# Patient Record
Sex: MALE | Race: OTHER | NOT HISPANIC OR LATINO | Employment: FULL TIME | URBAN - METROPOLITAN AREA
[De-identification: names, ages, dates, MRNs, and addresses within clinical notes are randomized per-mention and may not be internally consistent; named-entity substitution may affect disease eponyms.]

---

## 2018-12-19 ENCOUNTER — APPOINTMENT (OUTPATIENT)
Dept: LAB | Facility: HOSPITAL | Age: 58
End: 2018-12-19
Attending: FAMILY MEDICINE
Payer: COMMERCIAL

## 2018-12-19 ENCOUNTER — TRANSCRIBE ORDERS (OUTPATIENT)
Dept: ADMINISTRATIVE | Facility: HOSPITAL | Age: 58
End: 2018-12-19

## 2018-12-19 DIAGNOSIS — Z13.21 SCREENING FOR MALNUTRITION: Primary | ICD-10-CM

## 2018-12-19 DIAGNOSIS — Z13.21 SCREENING FOR MALNUTRITION: ICD-10-CM

## 2018-12-19 LAB
25(OH)D3 SERPL-MCNC: 18.5 NG/ML (ref 30–100)
ALBUMIN SERPL BCP-MCNC: 4.5 G/DL (ref 3.5–5.7)
ALP SERPL-CCNC: 54 U/L (ref 55–165)
ALT SERPL W P-5'-P-CCNC: 28 U/L (ref 7–52)
ANION GAP SERPL CALCULATED.3IONS-SCNC: 9 MMOL/L (ref 4–13)
AST SERPL W P-5'-P-CCNC: 26 U/L (ref 13–39)
BILIRUB SERPL-MCNC: 0.6 MG/DL (ref 0.2–1)
BUN SERPL-MCNC: 14 MG/DL (ref 7–25)
CALCIUM SERPL-MCNC: 8.9 MG/DL (ref 8.6–10.5)
CHLORIDE SERPL-SCNC: 104 MMOL/L (ref 98–107)
CHOLEST SERPL-MCNC: 174 MG/DL (ref 0–200)
CO2 SERPL-SCNC: 27 MMOL/L (ref 21–31)
CREAT SERPL-MCNC: 0.93 MG/DL (ref 0.7–1.3)
ERYTHROCYTE [DISTWIDTH] IN BLOOD BY AUTOMATED COUNT: 13.7 % (ref 11.6–15.1)
GFR SERPL CREATININE-BSD FRML MDRD: 89 ML/MIN/1.73SQ M
GLUCOSE P FAST SERPL-MCNC: 97 MG/DL (ref 65–99)
HCT VFR BLD AUTO: 43.2 % (ref 42–52)
HDLC SERPL-MCNC: 61 MG/DL (ref 40–60)
HGB BLD-MCNC: 14.6 G/DL (ref 12–17)
LDLC SERPL CALC-MCNC: 98 MG/DL (ref 0–100)
MCH RBC QN AUTO: 29.5 PG (ref 26.8–34.3)
MCHC RBC AUTO-ENTMCNC: 33.7 G/DL (ref 31.4–37.4)
MCV RBC AUTO: 87 FL (ref 82–98)
NONHDLC SERPL-MCNC: 113 MG/DL
PLATELET # BLD AUTO: 165 THOUSANDS/UL (ref 149–390)
PMV BLD AUTO: 8.5 FL (ref 8.9–12.7)
POTASSIUM SERPL-SCNC: 4.4 MMOL/L (ref 3.5–5.5)
PROT SERPL-MCNC: 7 G/DL (ref 6.4–8.9)
RBC # BLD AUTO: 4.95 MILLION/UL (ref 3.88–5.62)
SODIUM SERPL-SCNC: 140 MMOL/L (ref 134–143)
TRIGL SERPL-MCNC: 74 MG/DL (ref 44–166)
TSH SERPL DL<=0.05 MIU/L-ACNC: 2.16 UIU/ML (ref 0.45–5.33)
VIT B12 SERPL-MCNC: 219 PG/ML (ref 100–900)
WBC # BLD AUTO: 4.1 THOUSAND/UL (ref 4.31–10.16)

## 2018-12-19 PROCEDURE — 36415 COLL VENOUS BLD VENIPUNCTURE: CPT

## 2018-12-19 PROCEDURE — 82306 VITAMIN D 25 HYDROXY: CPT

## 2018-12-19 PROCEDURE — 80061 LIPID PANEL: CPT

## 2018-12-19 PROCEDURE — 82607 VITAMIN B-12: CPT

## 2018-12-19 PROCEDURE — 84443 ASSAY THYROID STIM HORMONE: CPT

## 2018-12-19 PROCEDURE — 80053 COMPREHEN METABOLIC PANEL: CPT

## 2018-12-19 PROCEDURE — 85027 COMPLETE CBC AUTOMATED: CPT

## 2020-01-29 ENCOUNTER — APPOINTMENT (OUTPATIENT)
Dept: RADIOLOGY | Facility: CLINIC | Age: 60
End: 2020-01-29

## 2020-01-29 ENCOUNTER — TRANSCRIBE ORDERS (OUTPATIENT)
Dept: LAB | Facility: CLINIC | Age: 60
End: 2020-01-29

## 2020-01-29 ENCOUNTER — APPOINTMENT (OUTPATIENT)
Dept: LAB | Facility: CLINIC | Age: 60
End: 2020-01-29

## 2020-01-29 DIAGNOSIS — R10.9 ABDOMINAL PAIN, UNSPECIFIED ABDOMINAL LOCATION: ICD-10-CM

## 2020-01-29 DIAGNOSIS — Z13.21 ENCOUNTER FOR SCREENING FOR NUTRITIONAL DISORDER: ICD-10-CM

## 2020-01-29 DIAGNOSIS — Z13.21 ENCOUNTER FOR SCREENING FOR NUTRITIONAL DISORDER: Primary | ICD-10-CM

## 2020-01-29 LAB
ALBUMIN SERPL BCP-MCNC: 4.1 G/DL (ref 3.5–5)
ALP SERPL-CCNC: 55 U/L (ref 46–116)
ALT SERPL W P-5'-P-CCNC: 25 U/L (ref 12–78)
ANION GAP SERPL CALCULATED.3IONS-SCNC: 1 MMOL/L (ref 4–13)
AST SERPL W P-5'-P-CCNC: 16 U/L (ref 5–45)
BILIRUB SERPL-MCNC: 0.46 MG/DL (ref 0.2–1)
BUN SERPL-MCNC: 17 MG/DL (ref 5–25)
CALCIUM SERPL-MCNC: 9 MG/DL (ref 8.3–10.1)
CHLORIDE SERPL-SCNC: 109 MMOL/L (ref 100–108)
CO2 SERPL-SCNC: 29 MMOL/L (ref 21–32)
CREAT SERPL-MCNC: 0.89 MG/DL (ref 0.6–1.3)
ERYTHROCYTE [DISTWIDTH] IN BLOOD BY AUTOMATED COUNT: 13.3 % (ref 11.6–15.1)
GFR SERPL CREATININE-BSD FRML MDRD: 92 ML/MIN/1.73SQ M
GLUCOSE P FAST SERPL-MCNC: 94 MG/DL (ref 65–99)
HCT VFR BLD AUTO: 42 % (ref 36.5–49.3)
HGB BLD-MCNC: 14 G/DL (ref 12–17)
MCH RBC QN AUTO: 29.4 PG (ref 26.8–34.3)
MCHC RBC AUTO-ENTMCNC: 33.3 G/DL (ref 31.4–37.4)
MCV RBC AUTO: 88 FL (ref 82–98)
PLATELET # BLD AUTO: 182 THOUSANDS/UL (ref 149–390)
PMV BLD AUTO: 10.2 FL (ref 8.9–12.7)
POTASSIUM SERPL-SCNC: 4.5 MMOL/L (ref 3.5–5.3)
PROT SERPL-MCNC: 7.3 G/DL (ref 6.4–8.2)
RBC # BLD AUTO: 4.76 MILLION/UL (ref 3.88–5.62)
SODIUM SERPL-SCNC: 139 MMOL/L (ref 136–145)
WBC # BLD AUTO: 6.22 THOUSAND/UL (ref 4.31–10.16)

## 2020-01-29 PROCEDURE — 80053 COMPREHEN METABOLIC PANEL: CPT

## 2020-01-29 PROCEDURE — 85027 COMPLETE CBC AUTOMATED: CPT

## 2020-01-29 PROCEDURE — 36415 COLL VENOUS BLD VENIPUNCTURE: CPT

## 2020-01-29 PROCEDURE — 74018 RADEX ABDOMEN 1 VIEW: CPT

## 2020-07-27 ENCOUNTER — OFFICE VISIT (OUTPATIENT)
Dept: URGENT CARE | Facility: CLINIC | Age: 60
End: 2020-07-27
Payer: COMMERCIAL

## 2020-07-27 VITALS
HEART RATE: 78 BPM | TEMPERATURE: 97.4 F | RESPIRATION RATE: 18 BRPM | OXYGEN SATURATION: 98 % | DIASTOLIC BLOOD PRESSURE: 72 MMHG | SYSTOLIC BLOOD PRESSURE: 123 MMHG
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
## 2020-07-27 DIAGNOSIS — Z71.1 WORRIED WELL: ICD-10-CM

## 2020-07-27 PROCEDURE — 99283 EMERGENCY DEPT VISIT LOW MDM: CPT | Performed by: NURSE PRACTITIONER

## 2020-07-27 PROCEDURE — 99203 OFFICE O/P NEW LOW 30 MIN: CPT | Performed by: NURSE PRACTITIONER

## 2020-07-27 PROCEDURE — G0382 LEV 3 HOSP TYPE B ED VISIT: HCPCS | Performed by: NURSE PRACTITIONER

## 2020-07-27 NOTE — PROGRESS NOTES
[facility] [facility]        NAME: Lavern Shaffer is a 56 y o  male  : 1960    MRN: [id number]  DATE: 2020  TIME: 2:52 PM    Assessment and Plan   Multiple insect bites [W57  XXXA]  1  Multiple insect bites     2  Worried well           Patient Instructions     There are no Patient Instructions on file for this visit  Chief Complaint     Chief Complaint   Patient presents with   [address]     yesterday was bit on forehead, chin and both arms  History of Present Illness   José Antonio Payne presents to the clinic c/o    This is a 59-year-old male here today with complaints of yellow candice stings  He had several stings on his forehead and on his arms  He states he was doing yd work and pulled up some grass and yellow jacks came out  He denies any shortness of breath or chest pain  He states Tdap is up-to-date  No fevers bodies or chills  Review of Systems   Review of Systems   Constitutional: Negative  HENT: Negative  Respiratory: Negative  Cardiovascular: Negative  Skin: Positive for wound  Neurological: Negative  Current Medications     Long-Term Medications   Medication Sig Dispense Refill    cetirizine (ZyrTEC) 10 mg tablet Take 10 mg by mouth daily         Current Allergies     Allergies as of 2020    (No Known Allergies)            The following portions of the patient's history were reviewed and updated as appropriate: allergies, current medications, past family history, past medical history, past social history, past surgical history and problem list     Objective   /72   Pulse 78   Temp (!) 97 4 °F (36 3 °C) (Temporal)   Resp 18   SpO2 98%        Physical Exam     Physical Exam   Constitutional: He appears well-developed and well-nourished  Cardiovascular: Normal rate, regular rhythm and normal heart sounds  Pulmonary/Chest: Effort normal and breath sounds normal    Skin: Skin is warm     Several small bites on left arm, no erythema no warmth  Bites on forehead are not noticeable   Psychiatric: He has a normal mood and affect  His behavior is normal  Judgment and thought content normal    Nursing note and vitals reviewed

## 2020-08-06 ENCOUNTER — OFFICE VISIT (OUTPATIENT)
Dept: URGENT CARE | Facility: CLINIC | Age: 60
End: 2020-08-06
Payer: COMMERCIAL

## 2020-08-06 VITALS
HEART RATE: 62 BPM | SYSTOLIC BLOOD PRESSURE: 117 MMHG | TEMPERATURE: 97.2 F | DIASTOLIC BLOOD PRESSURE: 75 MMHG | BODY MASS INDEX: 21.82 KG/M2 | HEIGHT: 64 IN | RESPIRATION RATE: 18 BRPM | WEIGHT: 127.8 LBS | OXYGEN SATURATION: 100 %

## 2020-08-06 DIAGNOSIS — H92.01 EARACHE ON RIGHT: ICD-10-CM
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
## 2020-08-06 DIAGNOSIS — S00.86XA INSECT BITE OF OTHER PART OF HEAD, INITIAL ENCOUNTER: Primary | ICD-10-CM

## 2020-08-06 PROCEDURE — G0382 LEV 3 HOSP TYPE B ED VISIT: HCPCS | Performed by: NURSE PRACTITIONER

## 2020-08-06 PROCEDURE — 99213 OFFICE O/P EST LOW 20 MIN: CPT | Performed by: NURSE PRACTITIONER

## 2020-08-06 PROCEDURE — 99283 EMERGENCY DEPT VISIT LOW MDM: CPT | Performed by: NURSE PRACTITIONER

## 2020-08-06 RX ORDER — CETIRIZINE HYDROCHLORIDE 10 MG/1
10 TABLET ORAL DAILY
COMMUNITY
End: 2021-04-16

## 2020-08-06 NOTE — PROGRESS NOTES
[facility]        NAME: David Parnell is a 56 y o  male  : 1960    MRN: [id number]  DATE: 2020  TIME: 5:16 PM    Assessment and Plan   Insect bite of other part of head, initial encounter [S00 96XA, W57  XXXA]  1  Insect bite of other part of head, initial encounter     2  Earache on right           Patient Instructions     Patient Instructions   Please return or proceed ER if he develops any facial swelling, fever, increased ear pain, decreased hearing, or any new or concerning symptoms  Advised follow-up with PCP in 3-5 days    Insect Bite or Sting   WHAT YOU NEED TO KNOW:   Most insect bites and stings are not dangerous and go away without treatment  Your symptoms may be mild, or you may develop anaphylaxis  Anaphylaxis is a sudden, life-threatening reaction that needs immediate treatment  Common examples of insects that bite or sting are bees, ticks, mosquitoes, spiders, and ants  Insect bites or stings can lead to diseases such as malaria, West Nile virus, Lyme disease, or [place] Spotted Fever  DISCHARGE INSTRUCTIONS:   Call 911 for signs or symptoms of anaphylaxis,  such as trouble breathing, swelling in your mouth or throat, or wheezing  You may also have itching, a rash, hives, or feel like you are going to faint  Return to the emergency department if:   · You are stung on your tongue or in your throat  · A white area forms around the bite  · You are sweating badly or have body pain  · You think you were bitten or stung by a poisonous insect  Contact your healthcare provider if:   · You have a fever  · The area becomes red, warm, tender, and swollen beyond the area of the bite or sting  · You have questions or concerns about your condition or care  Medicines:   · Antihistamines  decrease itching and rash  · Epinephrine  is used to treat severe allergic reactions such as anaphylaxis  · Take your medicine as directed    Contact your healthcare provider if you think your medicine is not helping or if you have side effects  Tell him of her if you are allergic to any medicine  Keep a list of the medicines, vitamins, and herbs you take  Include the amounts, and when and why you take them  Bring the list or the pill bottles to follow-up visits  Carry your medicine list with you in case of an emergency  Steps to take for signs or symptoms of anaphylaxis:   · Immediately  give 1 shot of epinephrine only into the outer thigh muscle  · Leave the shot in place  as directed  Your healthcare provider may recommend you leave it in place for up to 10 seconds before you remove it  This helps make sure all of the epinephrine is delivered  · Call 911 and go to the emergency department,  even if the shot improved symptoms  Do not drive yourself  Bring the used epinephrine shot with you  Safety precautions to take if you are at risk for anaphylaxis:   · Keep 2 shots of epinephrine with you at all times  You may need a second shot, because epinephrine only works for about 20 minutes and symptoms may return  Your healthcare provider can show you and family members how to give the shot  Check the expiration date every month and replace it before it expires  · Create an action plan  Your healthcare provider can help you create a written plan that explains the allergy and an emergency plan to treat a reaction  The plan explains when to give a second epinephrine shot if symptoms return or do not improve after the first  Give copies of the action plan and emergency instructions to family members, work and school staff, and  providers  Show them how to give a shot of epinephrine  · Carry medical alert identification  Wear medical alert jewelry or carry a card that says you have an insect allergy  Ask your healthcare provider where to get these items  If an insect bites or stings you:   · Remove the stinger    Scrape the stinger out with your fingernail, edge of a credit card, or a knife blade  Do not squeeze the wound  Gently wash the area with soap and water  · Remove the tick  Ticks must be removed as soon as possible so you do not get diseases passed through tick bites  Ask your healthcare provider for more information on tick bites and how to remove ticks  Care for a bite or sting wound:   · Elevate the affected area  Prop the wound above the level of your heart, if possible  Elevate the area for 10 to 20 minutes each hour or as directed by your healthcare provider  · Use compresses  Soak a clean washcloth in cold water, wring it out, and put it on the bite or sting  Use the compress for 10 to 20 minutes each hour or as directed by your healthcare provider  After 24 to 48 hours, change to warm compresses  · Apply a paste  Add water to baking soda to make a thick paste  Put the paste on the area for 5 minutes  Rinse gently to remove the paste  Prevent another insect bite or sting:   · Do not wear bright-colored or flower-print clothing when you plan to spend time outdoors  Do not use hairspray, perfumes, or aftershave  · Do not leave food out  · Empty any standing water and wash container with soap and water every 2 days  · Put screens on all open windows and doors  · Put insect repellent that contains DEET on skin that is showing when you go outside  Put insect repellent at the top of your boots, bottom of pant legs, and sleeve cuffs  Wear long sleeves, pants, and shoes  · Use citronella candles outdoors to help keep mosquitoes away  Put a tick and flea collar on pets  Follow up with your healthcare provider as directed:  Write down your questions so you remember to ask them during your visits  © 2017 [employer] Information is for End User's use only and may not be sold, redistributed or otherwise used for commercial purposes   All illustrations and images included in CareNotes® are the copyrighted property of [employer]  or [employer]  The above information is an  only  It is not intended as medical advice for individual conditions or treatments  Talk to your doctor, nurse or pharmacist before following any medical regimen to see if it is safe and effective for you  Follow up with PCP in 3-5 days  Proceed to  ER if symptoms worsen  Chief Complaint     Chief Complaint   Patient presents with    Insect Bite     right cheek was see here  aweek ago     Earache     right ear pain started monday after bite          History of Present Illness       Patient is a 58-year-old male presents with a 4 day history of right ear pain  Patient also notes insect bite to his right cheek  Patient states he was evaluated 1 week ago  Denies any facial swelling, tongue or lip swelling, difficulty breathing  Denies any ear drainage, tinnitus, decreased hearing  Has a recent ear infection  Denies any fever, chills, or body aches  Denies any headache, dizziness or feeling lightheaded  Denies any cough shortness of breath  Denies any recent sick contacts or known exposure to COVID-19  Patient denies any pain or pruritus to right cheek  Review of Systems   Review of Systems   Constitutional: Negative for chills, diaphoresis, fatigue and fever  HENT: Positive for ear pain  Negative for congestion, ear discharge, facial swelling, hearing loss, postnasal drip, rhinorrhea, sinus pressure, sinus pain, sore throat and trouble swallowing  Eyes: Negative  Respiratory: Negative for cough, chest tightness, shortness of breath, wheezing and stridor  Cardiovascular: Negative for chest pain and palpitations  Gastrointestinal: Negative  Musculoskeletal: Negative for arthralgias, back pain, joint swelling, myalgias, neck pain and neck stiffness  Skin: Negative for rash     Neurological: Negative for dizziness, syncope, facial asymmetry, speech difficulty, weakness, light-headedness, numbness and headaches  Current Medications       Current Outpatient Medications:     cetirizine (ZyrTEC) 10 mg tablet, Take 10 mg by mouth daily, Disp: , Rfl:     Current Allergies     Allergies as of 08/06/2020    (No Known Allergies)            The following portions of the patient's history were reviewed and updated as appropriate: allergies, current medications, past family history, past medical history, past social history, past surgical history and problem list      History reviewed  No pertinent past medical history  History reviewed  No pertinent surgical history  No family history on file  Medications have been verified  Objective   /75   Pulse 62   Temp (!) 97 2 °F (36 2 °C)   Resp 18   Ht 5' 4" (1 626 m)   Wt 58 kg (127 lb 12 8 oz)   SpO2 100%   BMI 21 94 kg/m²        Physical Exam     Physical Exam   Constitutional: He is oriented to person, place, and time  No distress  HENT:   Head: Normocephalic and atraumatic  Right Ear: Hearing, tympanic membrane, external ear and ear canal normal  No swelling or tenderness  Tympanic membrane is not erythematous and not bulging  Left Ear: Hearing, tympanic membrane, external ear and ear canal normal    Nose: Nose normal    Mouth/Throat: Uvula is midline  Mucous membranes are moist  Oropharynx is clear  Cardiovascular: Normal rate, regular rhythm, S1 normal, S2 normal and normal heart sounds  Pulmonary/Chest: Effort normal and breath sounds normal  There is normal air entry  Abdominal: Normal appearance  Lymphadenopathy:        Head (right side): No preauricular and no posterior auricular adenopathy present  Head (left side): No preauricular and no posterior auricular adenopathy present  He has no cervical adenopathy  Neurological: He is alert and oriented to person, place, and time  GCS eye subscore is 4  GCS verbal subscore is 5  GCS motor subscore is 6     Skin: Skin is warm and dry  Capillary refill takes less than 2 seconds  He is not diaphoretic

## 2020-08-06 NOTE — PATIENT INSTRUCTIONS
Please return or proceed ER if he develops any facial swelling, fever, increased ear pain, decreased hearing, or any new or concerning symptoms  Advised follow-up with PCP in 3-5 days    Insect Bite or Sting   WHAT YOU NEED TO KNOW:   Most insect bites and stings are not dangerous and go away without treatment  Your symptoms may be mild, or you may develop anaphylaxis  Anaphylaxis is a sudden, life-threatening reaction that needs immediate treatment  Common examples of insects that bite or sting are bees, ticks, mosquitoes, spiders, and ants  Insect bites or stings can lead to diseases such as malaria, West Nile virus, Lyme disease, or [place] Spotted Fever  DISCHARGE INSTRUCTIONS:   Call 911 for signs or symptoms of anaphylaxis,  such as trouble breathing, swelling in your mouth or throat, or wheezing  You may also have itching, a rash, hives, or feel like you are going to faint  Return to the emergency department if:   · You are stung on your tongue or in your throat  · A white area forms around the bite  · You are sweating badly or have body pain  · You think you were bitten or stung by a poisonous insect  Contact your healthcare provider if:   · You have a fever  · The area becomes red, warm, tender, and swollen beyond the area of the bite or sting  · You have questions or concerns about your condition or care  Medicines:   · Antihistamines  decrease itching and rash  · Epinephrine  is used to treat severe allergic reactions such as anaphylaxis  · Take your medicine as directed  Contact your healthcare provider if you think your medicine is not helping or if you have side effects  Tell him of her if you are allergic to any medicine  Keep a list of the medicines, vitamins, and herbs you take  Include the amounts, and when and why you take them  Bring the list or the pill bottles to follow-up visits  Carry your medicine list with you in case of an emergency    Steps to take for signs or symptoms of anaphylaxis:   · Immediately  give 1 shot of epinephrine only into the outer thigh muscle  · Leave the shot in place  as directed  Your healthcare provider may recommend you leave it in place for up to 10 seconds before you remove it  This helps make sure all of the epinephrine is delivered  · Call 911 and go to the emergency department,  even if the shot improved symptoms  Do not drive yourself  Bring the used epinephrine shot with you  Safety precautions to take if you are at risk for anaphylaxis:   · Keep 2 shots of epinephrine with you at all times  You may need a second shot, because epinephrine only works for about 20 minutes and symptoms may return  Your healthcare provider can show you and family members how to give the shot  Check the expiration date every month and replace it before it expires  · Create an action plan  Your healthcare provider can help you create a written plan that explains the allergy and an emergency plan to treat a reaction  The plan explains when to give a second epinephrine shot if symptoms return or do not improve after the first  Give copies of the action plan and emergency instructions to family members, work and school staff, and  providers  Show them how to give a shot of epinephrine  · Carry medical alert identification  Wear medical alert jewelry or carry a card that says you have an insect allergy  Ask your healthcare provider where to get these items  If an insect bites or stings you:   · Remove the stinger  Scrape the stinger out with your fingernail, edge of a credit card, or a knife blade  Do not squeeze the wound  Gently wash the area with soap and water  · Remove the tick  Ticks must be removed as soon as possible so you do not get diseases passed through tick bites  Ask your healthcare provider for more information on tick bites and how to remove ticks  Care for a bite or sting wound:   · Elevate the affected area  Prop the wound above the level of your heart, if possible  Elevate the area for 10 to 20 minutes each hour or as directed by your healthcare provider  · Use compresses  Soak a clean washcloth in cold water, wring it out, and put it on the bite or sting  Use the compress for 10 to 20 minutes each hour or as directed by your healthcare provider  After 24 to 48 hours, change to warm compresses  · Apply a paste  Add water to baking soda to make a thick paste  Put the paste on the area for 5 minutes  Rinse gently to remove the paste  Prevent another insect bite or sting:   · Do not wear bright-colored or flower-print clothing when you plan to spend time outdoors  Do not use hairspray, perfumes, or aftershave  · Do not leave food out  · Empty any standing water and wash container with soap and water every 2 days  · Put screens on all open windows and doors  · Put insect repellent that contains DEET on skin that is showing when you go outside  Put insect repellent at the top of your boots, bottom of pant legs, and sleeve cuffs  Wear long sleeves, pants, and shoes  · Use citronella candles outdoors to help keep mosquitoes away  Put a tick and flea collar on pets  Follow up with your healthcare provider as directed:  Write down your questions so you remember to ask them during your visits  © 2017 [employer] Information is for End User's use only and may not be sold, redistributed or otherwise used for commercial purposes  All illustrations and images included in CareNotes® are the copyrighted property of [employer]  or [employer]  The above information is an  only  It is not intended as medical advice for individual conditions or treatments  Talk to your doctor, nurse or pharmacist before following any medical regimen to see if it is safe and effective for you

## 2021-01-09 ENCOUNTER — OFFICE VISIT (OUTPATIENT)
Dept: URGENT CARE | Facility: CLINIC | Age: 61
End: 2021-01-09
Payer: COMMERCIAL

## 2021-01-09 VITALS
OXYGEN SATURATION: 99 % | SYSTOLIC BLOOD PRESSURE: 118 MMHG | HEIGHT: 64 IN | DIASTOLIC BLOOD PRESSURE: 62 MMHG | BODY MASS INDEX: 22.2 KG/M2 | TEMPERATURE: 96.9 F | HEART RATE: 74 BPM | WEIGHT: 130 LBS

## 2021-01-09 DIAGNOSIS — R52 BODY ACHES: Primary | ICD-10-CM

## 2021-01-09 DIAGNOSIS — Z20.822 EXPOSURE TO COVID-19 VIRUS: ICD-10-CM

## 2021-01-09 PROCEDURE — U0003 INFECTIOUS AGENT DETECTION BY NUCLEIC ACID (DNA OR RNA); SEVERE ACUTE RESPIRATORY SYNDROME CORONAVIRUS 2 (SARS-COV-2) (CORONAVIRUS DISEASE [COVID-19]), AMPLIFIED PROBE TECHNIQUE, MAKING USE OF HIGH THROUGHPUT TECHNOLOGIES AS DESCRIBED BY CMS-2020-01-R: HCPCS | Performed by: NURSE PRACTITIONER

## 2021-01-09 PROCEDURE — G0382 LEV 3 HOSP TYPE B ED VISIT: HCPCS | Performed by: NURSE PRACTITIONER

## 2021-01-09 PROCEDURE — 99213 OFFICE O/P EST LOW 20 MIN: CPT | Performed by: NURSE PRACTITIONER

## 2021-01-09 PROCEDURE — 99283 EMERGENCY DEPT VISIT LOW MDM: CPT | Performed by: NURSE PRACTITIONER

## 2021-01-09 PROCEDURE — U0005 INFEC AGEN DETEC AMPLI PROBE: HCPCS | Performed by: NURSE PRACTITIONER

## 2021-01-09 NOTE — PATIENT INSTRUCTIONS
Will test for COVID-19  You need to isolate into results are obtain  Rest and drink extra fluids  Tylenol as needed for pain or fever  Over-the-counter cough and cold medications as needed  Start vitamin D3 2000 IU po daily, Vitamin C 1 gram PO q 12 hours and multivitamin daily  Follow up with PCP if no improvement, call prior to any doctor visits if COVID testing is not back  Go to the ER with any worsening symptoms, chest pain, shortness of breath, difficulty breathing, lethargy, confusion, dehydration or change in skin color  [address]    Your healthcare provider and/or public health staff have evaluated you and have determined that you do not need to remain in the hospital at this time  At this time you can be isolated at home where you will be monitored by staff from your local or state health department  You should carefully follow the prevention and isolation steps below until a healthcare provider or local or state health department says that you can return to your normal activities  Stay home except to get medical care    People who are mildly ill with COVID-19 are able to isolate at home during their illness  You should restrict activities outside your home, except for getting medical care  Do not go to work, school, or public areas  Avoid using public transportation, ride-sharing, or taxis  Separate yourself from other people and animals in your home    People: As much as possible, you should stay in a specific room and away from other people in your home  Also, you should use a separate bathroom, if available  Animals: You should restrict contact with pets and other animals while you are sick with COVID-19, just like you would around other people  Although there have not been reports of pets or other animals becoming sick with COVID-19, it is still recommended that people sick with COVID-19 limit contact with animals until more information is known about the virus  When possible, have another member of your household care for your animals while you are sick  If you are sick with COVID-19, avoid contact with your pet, including petting, snuggling, being kissed or licked, and sharing food  If you must care for your pet or be around animals while you are sick, wash your hands before and after you interact with pets and wear a facemask  See COVID-19 and Animals for more information  Call ahead before visiting your doctor    If you have a medical appointment, call the healthcare provider and tell them that you have or may have COVID-19  This will help the healthcare providers office take steps to keep other people from getting infected or exposed  Wear a facemask    You should wear a facemask when you are around other people (e g , sharing a room or vehicle) or pets and before you enter a healthcare providers office  If you are not able to wear a facemask (for example, because it causes trouble breathing), then people who live with you should not stay in the same room with you, or they should wear a facemask if they enter your room  Cover your coughs and sneezes    Cover your mouth and nose with a tissue when you cough or sneeze  Throw used tissues in a lined trash can  Immediately wash your hands with soap and water for at least 20 seconds or, if soap and water are not available, clean your hands with an alcohol-based hand  that contains at least 60% alcohol  Clean your hands often    Wash your hands often with soap and water for at least 20 seconds, especially after blowing your nose, coughing, or sneezing; going to the bathroom; and before eating or preparing food  If soap and water are not readily available, use an alcohol-based hand  with at least 60% alcohol, covering all surfaces of your hands and rubbing them together until they feel dry  Soap and water are the best option if hands are visibly dirty   Avoid touching your eyes, nose, and mouth with unwashed hands  Avoid sharing personal household items    You should not share dishes, drinking glasses, cups, eating utensils, towels, or bedding with other people or pets in your home  After using these items, they should be washed thoroughly with soap and water  Clean all high-touch surfaces everyday    High touch surfaces include counters, tabletops, doorknobs, bathroom fixtures, toilets, phones, keyboards, tablets, and bedside tables  Also, clean any surfaces that may have blood, stool, or body fluids on them  Use a household cleaning spray or wipe, according to the label instructions  Labels contain instructions for safe and effective use of the cleaning product including precautions you should take when applying the product, such as wearing gloves and making sure you have good ventilation during use of the product  Monitor your symptoms    Seek prompt medical attention if your illness is worsening (e g , difficulty breathing)  Before seeking care, call your healthcare provider and tell them that you have, or are being evaluated for, COVID-19  Put on a facemask before you enter the facility  These steps will help the healthcare providers office to keep other people in the office or waiting room from getting infected or exposed  Ask your healthcare provider to call the local or [facility] health department  Persons who are placed under active monitoring or facilitated self-monitoring should follow instructions provided by their local health department or occupational health professionals, as appropriate  If you have a medical emergency and need to call 911, notify the dispatch personnel that you have, or are being evaluated for COVID-19  If possible, put on a facemask before emergency medical services arrive      Discontinuing home isolation    Patients with confirmed COVID-19 should remain under home isolation precautions until the following conditions are met:   - They have had no fever for at least 24 hours (that is one full day of no fever without the use medicine that reduces fevers)  AND  - other symptoms have improved (for example, when their cough or shortness of breath have improved)  AND  - If had mild or moderate illness, at least 10 days have passed since their symptoms first appeared or if severe illness (needed oxygen) or immunosuppressed, at least 20 days have passed since symptoms first appeared  Patients with confirmed COVID-19 should also notify close contacts (including their workplace) and ask that they self-quarantine  Currently, close contact is defined as being within 6 feet for 15 minutes or more from the period 24 hours starting 48 hours before symptom onset to the time at which the patient went into isolation  Close contacts of patients diagnosed with COVID-19 should be instructed by the patient to self-quarantine for 14 days from the last time of their last contact with the patient       Source: [employer]

## 2021-01-09 NOTE — PROGRESS NOTES
[address]        NAME: Yaw Mckeon is a 56 y o  male  : 1960    MRN: [id number]  DATE: 2021  TIME: 12:54 PM    Assessment and Plan   Body aches [R52]  1  Body aches  Novel Coronavirus (COVID-19), PCR [facility] - Office Collection         Patient Instructions     Patient Instructions   Will test for COVID-19  You need to isolate into results are obtain  Rest and drink extra fluids  Tylenol as needed for pain or fever  Over-the-counter cough and cold medications as needed  Start vitamin D3 2000 IU po daily, Vitamin C 1 gram PO q 12 hours and multivitamin daily  Follow up with PCP if no improvement, call prior to any doctor visits if COVID testing is not back  Go to the ER with any worsening symptoms, chest pain, shortness of breath, difficulty breathing, lethargy, confusion, dehydration or change in skin color  [address]    Your healthcare provider and/or public health staff have evaluated you and have determined that you do not need to remain in the hospital at this time  At this time you can be isolated at home where you will be monitored by staff from your local or state health department  You should carefully follow the prevention and isolation steps below until a healthcare provider or local or state health department says that you can return to your normal activities  Stay home except to get medical care    People who are mildly ill with COVID-19 are able to isolate at home during their illness  You should restrict activities outside your home, except for getting medical care  Do not go to work, school, or public areas  Avoid using public transportation, ride-sharing, or taxis  Separate yourself from other people and animals in your home    People: As much as possible, you should stay in a specific room and away from other people in your home  Also, you should use a separate bathroom, if available  Animals:  You should restrict contact with pets and other animals while you are sick with COVID-19, just like you would around other people  Although there have not been reports of pets or other animals becoming sick with COVID-19, it is still recommended that people sick with COVID-19 limit contact with animals until more information is known about the virus  When possible, have another member of your household care for your animals while you are sick  If you are sick with COVID-19, avoid contact with your pet, including petting, snuggling, being kissed or licked, and sharing food  If you must care for your pet or be around animals while you are sick, wash your hands before and after you interact with pets and wear a facemask  See COVID-19 and Animals for more information  Call ahead before visiting your doctor    If you have a medical appointment, call the healthcare provider and tell them that you have or may have COVID-19  This will help the healthcare providers office take steps to keep other people from getting infected or exposed  Wear a facemask    You should wear a facemask when you are around other people (e g , sharing a room or vehicle) or pets and before you enter a healthcare providers office  If you are not able to wear a facemask (for example, because it causes trouble breathing), then people who live with you should not stay in the same room with you, or they should wear a facemask if they enter your room  Cover your coughs and sneezes    Cover your mouth and nose with a tissue when you cough or sneeze  Throw used tissues in a lined trash can  Immediately wash your hands with soap and water for at least 20 seconds or, if soap and water are not available, clean your hands with an alcohol-based hand  that contains at least 60% alcohol      Clean your hands often    Wash your hands often with soap and water for at least 20 seconds, especially after blowing your nose, coughing, or sneezing; going to the bathroom; and before eating or preparing food  If soap and water are not readily available, use an alcohol-based hand  with at least 60% alcohol, covering all surfaces of your hands and rubbing them together until they feel dry  Soap and water are the best option if hands are visibly dirty  Avoid touching your eyes, nose, and mouth with unwashed hands  Avoid sharing personal household items    You should not share dishes, drinking glasses, cups, eating utensils, towels, or bedding with other people or pets in your home  After using these items, they should be washed thoroughly with soap and water  Clean all high-touch surfaces everyday    High touch surfaces include counters, tabletops, doorknobs, bathroom fixtures, toilets, phones, keyboards, tablets, and bedside tables  Also, clean any surfaces that may have blood, stool, or body fluids on them  Use a household cleaning spray or wipe, according to the label instructions  Labels contain instructions for safe and effective use of the cleaning product including precautions you should take when applying the product, such as wearing gloves and making sure you have good ventilation during use of the product  Monitor your symptoms    Seek prompt medical attention if your illness is worsening (e g , difficulty breathing)  Before seeking care, call your healthcare provider and tell them that you have, or are being evaluated for, COVID-19  Put on a facemask before you enter the facility  These steps will help the healthcare providers office to keep other people in the office or waiting room from getting infected or exposed  Ask your healthcare provider to call the local or state health department  Persons who are placed under active monitoring or facilitated self-monitoring should follow instructions provided by their local health department or occupational health professionals, as appropriate    If you have a medical emergency and need to call 911, notify the dispatch personnel that you have, or are being evaluated for COVID-19  If possible, put on a facemask before emergency medical services arrive  Discontinuing home isolation    Patients with confirmed COVID-19 should remain under home isolation precautions until the following conditions are met:   - They have had no fever for at least 24 hours (that is one full day of no fever without the use medicine that reduces fevers)  AND  - other symptoms have improved (for example, when their cough or shortness of breath have improved)  AND  - If had mild or moderate illness, at least 10 days have passed since their symptoms first appeared or if severe illness (needed oxygen) or immunosuppressed, at least 20 days have passed since symptoms first appeared  Patients with confirmed COVID-19 should also notify close contacts (including their workplace) and ask that they self-quarantine  Currently, close contact is defined as being within 6 feet for 15 minutes or more from the period 24 hours starting 48 hours before symptom onset to the time at which the patient went into isolation  Close contacts of patients diagnosed with COVID-19 should be instructed by the patient to self-quarantine for 14 days from the last time of their last contact with the patient  Source: [facility]         Chief Complaint     Chief Complaint   Patient presents with    Headache     started thursday just not feeling well     Muscle Pain         History of Present Illness   José Antonio Payne presents to the clinic c/o    This is a 40-year-old male here today with complaints headache and body aches  He states symptoms started 2 days ago  He is also having chills  He has some nasal congestion  He denies any loss of taste or smell  No chest pain or shortness of breath  He denies any diarrhea  He is complaining of some back pain    No urinary symptoms including urgency frequency or burning  He does note that he was  exposed to a COVID positive neighbor last week  He states headache is more annoying headache  Review of Systems   Review of Systems   Constitutional: Positive for activity change, chills and fatigue  Negative for fever  HENT: Positive for congestion and rhinorrhea  Negative for sinus pressure and sinus pain  Respiratory: Negative for cough, shortness of breath and wheezing  Cardiovascular: Negative  Gastrointestinal: Negative  Musculoskeletal: Negative  Skin: Negative  Neurological: Positive for headaches  Psychiatric/Behavioral: Negative  Current Medications     Long-Term Medications   Medication Sig Dispense Refill    cetirizine (ZyrTEC) 10 mg tablet Take 10 mg by mouth daily         Current Allergies     Allergies as of 01/09/2021    (No Known Allergies)            The following portions of the patient's history were reviewed and updated as appropriate: allergies, current medications, past family history, past medical history, past social history, past surgical history and problem list     Objective   /62   Pulse 74   Temp (!) 96 9 °F (36 1 °C)   Ht 5' 4" (1 626 m)   Wt 59 kg (130 lb)   SpO2 99%   BMI 22 31 kg/m²        Physical Exam     Physical Exam  Vitals signs and nursing note reviewed  Constitutional:       General: He is not in acute distress  Appearance: Normal appearance  He is ill-appearing (mild)  He is not toxic-appearing  HENT:      Right Ear: Tympanic membrane normal       Left Ear: Tympanic membrane normal       Nose: No congestion or rhinorrhea  Neck:      Musculoskeletal: Normal range of motion  Cardiovascular:      Rate and Rhythm: Normal rate and regular rhythm  Pulses: Normal pulses  Heart sounds: Normal heart sounds  Pulmonary:      Effort: Pulmonary effort is normal  No respiratory distress  Breath sounds: Normal breath sounds  No stridor   No wheezing, rhonchi or rales  Comments: No difficulty breathing, or dyspnea  Chest:      Chest wall: No tenderness  Neurological:      Mental Status: He is alert and oriented to person, place, and time  Psychiatric:         Mood and Affect: Mood normal          Behavior: Behavior normal          Thought Content:  Thought content normal          Judgment: Judgment normal

## 2021-01-11 ENCOUNTER — TELEPHONE (OUTPATIENT)
Dept: URGENT CARE | Facility: CLINIC | Age: 61
End: 2021-01-11

## 2021-01-11 LAB — SARS-COV-2 RNA SPEC QL NAA+PROBE: DETECTED

## 2021-01-11 NOTE — TELEPHONE ENCOUNTER
Patient is not available  Daughter answered phone and stated he would be available after noon  Discussed with her he should be quarantining as discussed at visit  Will attempt to call back after noon  Patient returned call  Spoke with patient and daughter  Patient aware of positive COVID 19 test   Discussed with patient they will need to isolate for a total of 10 days since onset of symptoms  They can come off isolation at that time as long as symptoms are improving and they are fever free for 24 hours  Continue to rest and drink extra fluids  Continue Vitamin C, Vitamin D and multivitamin as discussed as visit  Continue to ambulate and walk as much as possible  Tylenol as needed for pain or fever  Follow up with PCP as they may be able to virtual visit as recheck  Go to ER with any chest pain, sob, difficulty breathing, lethargy, confusion, dehydration, change in skin color  Patient should be wearing a mask in home when in shared spaces, all cory touch surfaces need to be wiped frequently and they should have own bathroom to use if possible  Other family members will need to quarantine for a minimum of 10 days after last exposure to patient  Daughters states wife and her have some mild symptoms, recommend they called PCP or hotline

## 2021-01-15 ENCOUNTER — TELEMEDICINE (OUTPATIENT)
Dept: INTERNAL MEDICINE CLINIC | Facility: CLINIC | Age: 61
End: 2021-01-15
Payer: COMMERCIAL

## 2021-01-15 VITALS — TEMPERATURE: 98.7 F | HEIGHT: 64 IN | WEIGHT: 125 LBS | BODY MASS INDEX: 21.34 KG/M2

## 2021-01-15 DIAGNOSIS — Z12.11 SCREEN FOR COLON CANCER: ICD-10-CM

## 2021-01-15 DIAGNOSIS — U07.1 COVID-19: ICD-10-CM

## 2021-01-15 DIAGNOSIS — Z11.59 NEED FOR HEPATITIS C SCREENING TEST: Primary | ICD-10-CM

## 2021-01-15 PROCEDURE — 3008F BODY MASS INDEX DOCD: CPT | Performed by: INTERNAL MEDICINE

## 2021-01-15 PROCEDURE — 1036F TOBACCO NON-USER: CPT | Performed by: INTERNAL MEDICINE

## 2021-01-15 PROCEDURE — 99213 OFFICE O/P EST LOW 20 MIN: CPT | Performed by: INTERNAL MEDICINE

## 2021-01-15 PROCEDURE — 3725F SCREEN DEPRESSION PERFORMED: CPT | Performed by: INTERNAL MEDICINE

## 2021-01-15 NOTE — LETTER
January 19, 2021     Patient: Nj Iqbal   YOB: 1960   Date of Visit: 1/15/2021       To Whom it May Concern:    Nj Iqbal is under my professional care  He was seen in my office on 1/15/2021  He may return to work on 1/19/2021  If you have any questions or concerns, please don't hesitate to call           Sincerely,          Miriam Moss,         CC: No Recipients

## 2021-01-15 NOTE — PROGRESS NOTES
Virtual Brief Visit    Assessment/Plan:  The patient has had COVID 19 for 11 days and is afebrile and has improving symptoms  We will break isolation today and he can return to normal activities as tolerated    Problem List Items Addressed This Visit     None      Visit Diagnoses     Need for hepatitis C screening test    -  Primary    Screen for colon cancer        COVID-19                    Reason for visit is   Chief Complaint   Patient presents with    COVID-19     PHONE CALL [phone]  [facility]  COVID positive 1/9/2021  Pt c/o on and off headache, loss of taste and smell   Virtual Brief Visit        Encounter provider Ana Dan DO    Provider located at [address] [place] [id number]    Recent Visits  No visits were found meeting these conditions  Showing recent visits within past 7 days and meeting all other requirements     Today's Visits  Date Type Provider Dept   01/15/21 Telemedicine Ana Dan DO [facility] today's visits and meeting all other requirements     Future Appointments  No visits were found meeting these conditions  Showing future appointments within next 150 days and meeting all other requirements        After connecting through telephone, the patient was identified by name and date of birth  José Antonio Payne was informed that this is a telemedicine visit and that the visit is being conducted through telephone  My office door was closed  No one else was in the room  He acknowledged consent and understanding of privacy and security of the platform  The patient has agreed to participate and understands he can discontinue the visit at any time  Patient is aware this is a billable service  Subjective    José Antonio Payne is a 59 y o  male Covid 19 infection x 11 days  The patient called by phone and noted to have issues with cough    The patient states that he has no fever or SOB  He states that he does have a mild HA in the evenings resolved with Tylenol  and states that he has no GI symptoms  Tested (+) for COVID on January 9  Past Medical History:   Diagnosis Date    Kidney stone        Past Surgical History:   Procedure Laterality Date    KIDNEY STONE SURGERY         Current Outpatient Medications   Medication Sig Dispense Refill    cetirizine (ZyrTEC) 10 mg tablet Take 10 mg by mouth daily       No current facility-administered medications for this visit  No Known Allergies    Review of Systems   Constitutional: Negative for chills, fatigue and fever  HENT: Positive for postnasal drip and rhinorrhea  Respiratory: Positive for cough  Negative for chest tightness and shortness of breath  Cardiovascular: Negative for chest pain and palpitations  Gastrointestinal: Negative for abdominal pain, constipation, diarrhea, nausea and vomiting  Genitourinary: Negative  Musculoskeletal: Negative for back pain and myalgias  Skin: Negative  Neurological: Positive for headaches  Psychiatric/Behavioral: Negative for dysphoric mood  The patient is not nervous/anxious  Vitals:    01/15/21 1228   Temp: 98 7 °F (37 1 °C)   Weight: 56 7 kg (125 lb)   Height: 5' 4" (1 626 m)         I spent 15 minutes directly with the patient during this visit    VIRTUAL VISIT DISCLAIMER    José Antonio Payne acknowledges that he has consented to an online visit or consultation  He understands that the online visit is based solely on information provided by him, and that, in the absence of a face-to-face physical evaluation by the physician, the diagnosis he receives is both limited and provisional in terms of accuracy and completeness  This is not intended to replace a full medical face-to-face evaluation by the physician  José Antonio Payne understands and accepts these terms

## 2021-01-15 NOTE — LETTER
Dear Dr Mynor Fajardo is interested in participating in the [facility] COVID-19 convalescent plasma collection program  Please see my attached note verifying eligibility  Sincerely,    Charlie Mo DO          Virtual Brief Visit    Assessment/Plan:  The patient has had [facility] 78 for 11 days and is afebrile and has improving symptoms  We will break isolation today and he can return to normal activities as tolerated    Problem List Items Addressed This Visit     None      Visit Diagnoses     Need for hepatitis C screening test    -  Primary    Screen for colon cancer                    Reason for visit is   Chief Complaint   Patient presents with    COVID-19     PHONE CALL [phone]  NEWP  COVID positive 1/9/2021  Pt c/o on and off headache, loss of taste and smell   Virtual Brief Visit        Encounter provider Charlie Mo DO    Provider located at [address] [address]    Recent Visits  No visits were found meeting these conditions  Showing recent visits within past 7 days and meeting all other requirements     Today's Visits  Date Type Provider Dept   01/15/21 Telemedicine DO Everardo Cheek today's visits and meeting all other requirements     Future Appointments  No visits were found meeting these conditions  Showing future appointments within next 150 days and meeting all other requirements        After connecting through telephone, the patient was identified by name and date of birth  José Antonio Payne was informed that this is a telemedicine visit and that the visit is being conducted through telephone  My office door was closed  No one else was in the room  He acknowledged consent and understanding of privacy and security of the platform   The patient has agreed to participate and understands he can discontinue the visit at any time     Patient is aware this is a billable service  Subjective    José Antonio Payne is a 59 y o  male Covid 19 infection x 11 days  The patient called by phone and noted to have issues with cough  The patient states that he has no fever or SOB  He states that he does have a mild HA in the evenings resolved with Tylenol  and states that he has no GI symptoms  Tested (+) for COVID on January 9  Past Medical History:   Diagnosis Date    Kidney stone        Past Surgical History:   Procedure Laterality Date    KIDNEY STONE SURGERY         Current Outpatient Medications   Medication Sig Dispense Refill    cetirizine (ZyrTEC) 10 mg tablet Take 10 mg by mouth daily       No current facility-administered medications for this visit  No Known Allergies    Review of Systems   Constitutional: Negative for chills, fatigue and fever  HENT: Positive for postnasal drip and rhinorrhea  Respiratory: Positive for cough  Negative for chest tightness and shortness of breath  Cardiovascular: Negative for chest pain and palpitations  Gastrointestinal: Negative for abdominal pain, constipation, diarrhea, nausea and vomiting  Genitourinary: Negative  Musculoskeletal: Negative for back pain and myalgias  Skin: Negative  Neurological: Positive for headaches  Psychiatric/Behavioral: Negative for dysphoric mood  The patient is not nervous/anxious  Vitals:    01/15/21 1228   Temp: 98 7 °F (37 1 °C)   Weight: 56 7 kg (125 lb)   Height: 5' 4" (1 626 m)         I spent 15 minutes directly with the patient during this visit    VIRTUAL VISIT DISCLAIMER    José Antonio Payne acknowledges that he has consented to an online visit or consultation   He understands that the online visit is based solely on information provided by him, and that, in the absence of a face-to-face physical evaluation by the physician, the diagnosis he receives is both limited and provisional in terms of accuracy and completeness  This is not intended to replace a full medical face-to-face evaluation by the physician  José Antonio Payne understands and accepts these terms

## 2021-04-02 ENCOUNTER — OFFICE VISIT (OUTPATIENT)
Dept: INTERNAL MEDICINE CLINIC | Facility: CLINIC | Age: 61
End: 2021-04-02
Payer: COMMERCIAL

## 2021-04-02 ENCOUNTER — APPOINTMENT (OUTPATIENT)
Dept: RADIOLOGY | Facility: CLINIC | Age: 61
End: 2021-04-02
Payer: COMMERCIAL

## 2021-04-02 VITALS
HEIGHT: 64 IN | WEIGHT: 129.4 LBS | DIASTOLIC BLOOD PRESSURE: 70 MMHG | RESPIRATION RATE: 14 BRPM | BODY MASS INDEX: 22.09 KG/M2 | SYSTOLIC BLOOD PRESSURE: 115 MMHG | TEMPERATURE: 96.8 F

## 2021-04-02 DIAGNOSIS — M25.561 ACUTE PAIN OF RIGHT KNEE: Primary | ICD-10-CM

## 2021-04-02 DIAGNOSIS — M25.561 ACUTE PAIN OF RIGHT KNEE: ICD-10-CM

## 2021-04-02 PROCEDURE — 73564 X-RAY EXAM KNEE 4 OR MORE: CPT

## 2021-04-02 PROCEDURE — 99214 OFFICE O/P EST MOD 30 MIN: CPT | Performed by: NURSE PRACTITIONER

## 2021-04-02 RX ORDER — METHOCARBAMOL 500 MG/1
500 TABLET, FILM COATED ORAL 4 TIMES DAILY PRN
Qty: 90 TABLET | Refills: 0 | Status: SHIPPED | OUTPATIENT
Start: 2021-04-02 | End: 2021-10-22

## 2021-04-02 NOTE — PATIENT INSTRUCTIONS
Knee Pain   WHAT YOU NEED TO KNOW:   Knee pain may start suddenly, or it may be a long-term problem  You may have pain on the side, front, or back of your knee  You may have knee stiffness and swelling  You may hear popping sounds or feel like your knee is giving way or locking up as you walk  You may feel pain when you sit, stand, walk, or climb up and down stairs  Knee pain can be caused by conditions such as obesity, inflammation, or strains or tears in ligaments or tendons  DISCHARGE INSTRUCTIONS:   Return to the emergency department if:   · Your pain is worse, even after treatment  · You cannot bend or straighten your leg completely  · The swelling around your knee does not go down even with treatment  · Your knee is painful and hot to the touch  Contact your healthcare provider if:   · You have questions or concerns about your condition or care  Medicines: You may need any of the following:  · NSAIDs  help decrease swelling and pain or fever  This medicine is available with or without a doctor's order  NSAIDs can cause stomach bleeding or kidney problems in certain people  If you take blood thinner medicine, always ask your healthcare provider if NSAIDs are safe for you  Always read the medicine label and follow directions  · Acetaminophen  decreases pain and fever  It is available without a doctor's order  Ask how much to take and how often to take it  Follow directions  Read the labels of all other medicines you are using to see if they also contain acetaminophen, or ask your doctor or pharmacist  Acetaminophen can cause liver damage if not taken correctly  Do not use more than 4 grams (4,000 milligrams) total of acetaminophen in one day  · Prescription pain medicine  may be given  Ask your healthcare provider how to take this medicine safely  Some prescription pain medicines contain acetaminophen   Do not take other medicines that contain acetaminophen without talking to your healthcare provider  Too much acetaminophen may cause liver damage  Prescription pain medicine may cause constipation  Ask your healthcare provider how to prevent or treat constipation  · Take your medicine as directed  Contact your healthcare provider if you think your medicine is not helping or if you have side effects  Tell him or her if you are allergic to any medicine  Keep a list of the medicines, vitamins, and herbs you take  Include the amounts, and when and why you take them  Bring the list or the pill bottles to follow-up visits  Carry your medicine list with you in case of an emergency  What you can do to manage your symptoms:   · Rest your knee so it can heal   Limit activities that increase your pain  Do low-impact exercises, such as walking or swimming  · Apply ice to help reduce swelling and pain  Use an ice pack, or put crushed ice in a plastic bag  Cover it with a towel before you apply it to your knee  Apply ice for 15 to 20 minutes every hour, or as directed  · Apply compression to help reduce swelling  Use a brace or bandage only as directed  · Elevate your knee to help decrease pain and swelling  Elevate your knee while you are sitting or lying down  Prop your leg on pillows to keep your knee above the level of your heart  · Prevent your knee from moving as directed  Your healthcare provider may put on a cast or splint  You may need to wear a leg brace to stabilize your knee  A leg brace can be adjusted to increase your range of motion as your knee heals  What you can do to prevent knee pain:   · Maintain a healthy weight  Extra weight increases your risk for knee pain  Ask your healthcare provider how much you should weigh  He or she can help you create a safe weight loss plan if you need to lose weight  · Exercise or train properly  Use the correct equipment for sports  Wear shoes that provide good support   Check your posture often as you exercise, play sports, or train for an event  This can help prevent stress and strain on your knees  Rest between sessions so you do not overwork your knees  Follow up with your healthcare provider within 24 hours or as directed: You may need follow-up treatments, such as steroid injections to decrease pain  Write down your questions so you remember to ask them during your visits  © Copyright [employer] Information is for End User's use only and may not be sold, redistributed or otherwise used for commercial purposes  All illustrations and images included in CareNotes® are the copyrighted property of [employer]  or [facility] Kalia Lawton   The above information is an  only  It is not intended as medical advice for individual conditions or treatments  Talk to your doctor, nurse or pharmacist before following any medical regimen to see if it is safe and effective for you

## 2021-04-02 NOTE — PROGRESS NOTES
Assessment/Plan:    Problem List Items Addressed This Visit        Other    Acute pain of right knee - Primary     · Right knee pain  · Suspect meniscus tear  · Will send for xray - if no fractures can order CT scan or MRI  · Consult to orthopedics  · Suggest a knee brace/ACE wrap for support  · Will order robaxin to help with muscle pain                Diagnoses and all orders for this visit:    Acute pain of right knee     Acute pain of right knee  · Right knee pain  · Suspect meniscus tear  · Will send for xray - if no fractures can order CT scan or MRI  · Consult to orthopedics  · Suggest a knee brace/ACE wrap for support  · Will order robaxin to help with muscle pain        Subjective:      Patient ID: Sai Otto is a 59 y o  male  Right Knee Pain  Patient presents without known injury/ possible injury involving the right knee  Onset of the symptoms was several days ago  Inciting event: none known  Current symptoms include crepitus sensation, pain located inner aspect of right knee and stiffness  Pain is aggravated by any weight bearing, going up and down stairs, inactivity, kneeling, rising after sitting, squatting and walking  Patient has had no prior knee problems  Evaluation to date: none  Treatment to date: OTC analgesics which are not very effective - tylenol and pain spray  Will order the patient Robaxin for the pain  Order Xray of the right knee  Referral to Orthopedics  The following portions of the patient's history were reviewed and updated as appropriate:   Past Medical History:  He has a past medical history of Kidney stone  ,  _______________________________________________________________________  Medical Problems:  does not have any pertinent problems on file ,  _______________________________________________________________________  Past Surgical History:   has a past surgical history that includes Kidney stone surgery  ,  _______________________________________________________________________  Family History:  family history includes No Known Problems in his father and mother ,  _______________________________________________________________________  Social History:   reports that he has never smoked  He has never used smokeless tobacco  He reports previous alcohol use  He reports that he does not use drugs  ,  _______________________________________________________________________  Allergies:  has No Known Allergies     _______________________________________________________________________  Current Outpatient Medications   Medication Sig Dispense Refill    cetirizine (ZyrTEC) 10 mg tablet Take 10 mg by mouth daily       No current facility-administered medications for this visit       _______________________________________________________________________  Review of Systems   Constitutional: Negative for activity change, chills, fatigue and fever  HENT: Negative for rhinorrhea and sore throat  Eyes: Negative for pain  Respiratory: Negative for cough and shortness of breath  Cardiovascular: Negative for chest pain, palpitations and leg swelling  Gastrointestinal: Negative for abdominal pain, constipation, diarrhea, nausea and vomiting  Genitourinary: Negative for difficulty urinating, flank pain, frequency and urgency  Musculoskeletal: Positive for arthralgias and gait problem  Negative for joint swelling and myalgias  Skin: Negative for color change  Neurological: Negative for dizziness, weakness, light-headedness and headaches  Psychiatric/Behavioral: Negative for sleep disturbance  The patient is not nervous/anxious  All other systems reviewed and are negative          Objective:  Vitals:    04/02/21 0925   BP: 115/70   BP Location: Left arm   Patient Position: Sitting   Cuff Size: Large   Resp: 14   Temp: (!) 96 8 °F (36 °C)   Weight: 58 7 kg (129 lb 6 4 oz)   Height: 5' 4" (1 626 m)     Body mass index is 22 21 kg/m²  Physical Exam  Vitals signs reviewed  Constitutional:       General: He is awake  Appearance: Normal appearance  He is well-developed  HENT:      Head: Normocephalic and atraumatic  Nose: Nose normal       Mouth/Throat:      Mouth: Mucous membranes are moist    Eyes:      Extraocular Movements: Extraocular movements intact  Neck:      Musculoskeletal: Normal range of motion  Cardiovascular:      Rate and Rhythm: Normal rate and regular rhythm  Pulses: Normal pulses  Heart sounds: Normal heart sounds  Pulmonary:      Effort: Pulmonary effort is normal       Breath sounds: Normal breath sounds  Abdominal:      General: Bowel sounds are normal       Palpations: Abdomen is soft  Musculoskeletal:      Right knee: He exhibits decreased range of motion  Tenderness found  Right lower leg: No edema  Left lower leg: No edema  Skin:     General: Skin is warm and dry  Neurological:      Mental Status: He is alert and oriented to person, place, and time  Psychiatric:         Attention and Perception: Attention normal          Mood and Affect: Mood normal          Speech: Speech normal          Behavior: Behavior normal  Behavior is cooperative             PHQ-9 Depression Screening    PHQ-9:   Frequency of the following problems over the past two weeks:

## 2021-04-02 NOTE — ASSESSMENT & PLAN NOTE
· Right knee pain  · Suspect meniscus tear  · Will send for xray - if no fractures can order CT scan or MRI  · Consult to orthopedics  · Suggest a knee brace/ACE wrap for support  · Will order robaxin to help with muscle pain

## 2021-04-02 NOTE — PROGRESS NOTES
Xray results of right knee show mild degenerative change sin the medial compartment/enthesopathic changes - Patellar Tendonitis/Runner's Knee  The patient has complained of worsening pain with use of the right knee  He was told to follow a PRICE: protect his knee with decreased activity/ACE Wrap; Rest his leg; Ice his leg; Compression - ACE Wrap; Elevate his leg  He should continue to use the Robaxin I ordered  I also placed an order for Physical Therapy  He can keep the information for the Referral to Orthopedics as well

## 2021-04-14 ENCOUNTER — EVALUATION (OUTPATIENT)
Dept: PHYSICAL THERAPY | Facility: CLINIC | Age: 61
End: 2021-04-14
Payer: COMMERCIAL

## 2021-04-14 DIAGNOSIS — R26.2 DIFFICULTY WALKING: ICD-10-CM

## 2021-04-14 DIAGNOSIS — M25.561 ACUTE PAIN OF RIGHT KNEE: Primary | ICD-10-CM

## 2021-04-14 PROCEDURE — 97162 PT EVAL MOD COMPLEX 30 MIN: CPT | Performed by: PHYSICAL THERAPIST

## 2021-04-14 PROCEDURE — 97110 THERAPEUTIC EXERCISES: CPT | Performed by: PHYSICAL THERAPIST

## 2021-04-14 NOTE — PROGRESS NOTES
PT Evaluation     Today's date: 2021  Patient name: Elisa Bishop  : 1960  MRN: [id number]  Referring provider: COLBY Jordan  Dx:   Encounter Diagnosis     ICD-10-CM    1  Acute pain of right knee  M25 561    2  Difficulty walking  R26 2        Start Time: 1230  Stop Time: 1315  Total time in clinic (min): 45 minutes    Assessment  Assessment details: José Antonio Payne was seen for an initial PT evaluation today  Patient is a 59 y o  male with diagnosis of right knee pain and past medical history significant for kidney stone surgery, OA  Moderate complexity evaluation  due to number of participation restrictions, functional outcome measure of 53% limitation, and changing clinical presentation  Findings today show tightness of lower extremity musculature with weakness of R>L hip and knee, decreased stability and pain impacting his ability to walk and perform transfers  Skilled PT indicated to treat at this time to address above stated deficits to decrease pain and improve functional mobility  Impairments: abnormal gait, abnormal muscle firing, abnormal muscle tone, abnormal or restricted ROM, abnormal movement, activity intolerance, impaired balance, impaired physical strength, lacks appropriate home exercise program, pain with function and safety issue  Functional limitations: walking, stairs, squatting, lunging, lifting, kneeling  Goals  STG (6 weeks)  1  Patient will have reported 0/10 pain in right knee at rest    2  Improve patient's right LE hamstring length by 10 degrees for increased ability to forward bend  3  Increase patient's right single leg balance to 5 seconds for increased stability on stairs  LTG (12 weeks)  1  Patient's LE strength will be equal bilaterally for ability to ambulate and return to functional activities at [facility]  2  Patient will be able to perform sit to stand transfer with 0/10 pain in right knee     3  Patient will be independent with home exercise program for continued maintenance post PT discharge  Plan  Plan details: Progress note in 4 weeks  Patient to be scheduled with ortho and continue PT  Patient would benefit from: skilled physical therapy  Planned modality interventions: unattended electrical stimulation, thermotherapy: hydrocollator packs and cryotherapy  Planned therapy interventions: manual therapy, neuromuscular re-education, self care, home exercise program, gait training, therapeutic exercise and therapeutic activities  Frequency: 2x week  Duration in weeks: 12  Plan of Care beginning date: 2021  Plan of Care expiration date: 2021  Treatment plan discussed with: patient and PTA        Subjective Evaluation    History of Present Illness  Date of onset: 3/14/2021  Mechanism of injury: Alec Quiñones is a 59 y o  male who presents to outpatient Physical Therapy today with complaints of right knee pain  States pain began approximately 1 month ago of insidious onset  Now is having difficulty with navigating stairs, walking, and transfers  X-ray IMPRESSION:     Mild degenerative changes in the medial compartment  No acute displaced fracture    Pain  Current pain ratin  At best pain ratin  At worst pain rating: 10  Location: anterior right knee  Quality: sharp  Aggravating factors: stair climbing, walking, standing and lifting  Progression: worsening    Social Support  Steps to enter house: yes  Stairs in house: no   Lives in: [facility]  Lives with: spouse    Employment status: working ([employer] (prolonged standing, lifting, walking))  Patient Goals  Patient goals for therapy: decreased pain  Patient goal: work pain free, navigate stairs normally  Does not want surgery if indicated, but would like to return to PLOF         Objective     Palpation     Right   Tenderness of the distal biceps femoris, rectus femoris and vastus lateralis       Tenderness     Right Knee   Tenderness in the ITB, lateral joint line, lateral patella, medial patella and superior patella  No tenderness in the medial joint line  Neurological Testing     Sensation     Knee   Left Knee   Intact: light touch    Right Knee   Intact: light touch     Active Range of Motion   Left Knee   Flexion: 138 degrees   Extension: 9 degrees     Right Knee   Flexion: 136 degrees   Extension: 10 degrees with pain    Mobility   Patellar Mobility:     Right Knee   Hypomobile: medial, lateral, superior and inferior     Strength/Myotome Testing     Left Hip   Planes of Motion   Flexion: 5  Extension: 4-  Abduction: 4+    Right Hip   Planes of Motion   Flexion: 4+  Extension: 3+  Abduction: 4    Left Knee   Normal strength    Right Knee   Flexion: 5  Extension: 4  Quadriceps contraction: fair    Tests     Right Knee   Positive patellar compression  Negative anterior drawer, anterior Lachman, lateral Mello, medial Mello, Thessaly's test at 5 degrees, Thessaly's test at 20 degrees, valgus stress test at 0 degrees, valgus stress test at 30 degrees, varus stress test at 0 degrees and varus stress test at 30 degrees       Additional Tests Details  Hamstring 90/90 SLR R=(35) L= (30)  (-) ely's bilaterally    General Comments:      Knee Comments  Gait: antalgic, decreased stance on RLE    FOTO: 47% function, 61% predicted function         Flowsheet Rows      Most Recent Value   PT/OT G-Codes   Current Score  47   Projected Score  61             Precautions: none noted  Progress note: 5/14  POC: 7/14    Manuals 4/14               stretching        Patellar mobs                Neuro Re-Ed        QS        SLB        Tandem stance        Fitter board        steamboat                        Ther Ex        Bike  *      SAQ        LAQ With ball squeeze :05x10       bridging        Leg raises With ER 10x               clamshell        Hip ext in prone        HOIST  Knee ext  Knee flex          Leg press S=  Squat  HR        Standing HR/TR        TKE Standing hamstring curls                                        Calf stretch        Hip flexor stretch  *      Hamstring stretch Seated 3x:30       Ther Activity        Step ups        Sit to stand        Mini squats                        Gait Training                        Modalities                        José Antonio Payne was given a handout and verbal instruction of customized home exercise program  Patient accepted program and was able to demonstrate exercises  Access Code: [email or web address]  URL: [email or web address]  Date: 04/14/2021  Prepared by: Murtaza Vincent    Exercises  Seated Long Arc Quad with Hip Adduction - 1 x daily - 7 x weekly - 10 reps - 3 sets  Straight Leg Raise with External Rotation - 1 x daily - 7 x weekly - 10 reps - 3 sets  Seated Hamstring Stretch - 3 x daily - 7 x weekly - 1 sets - 3 reps - 30 sec hold

## 2021-04-16 ENCOUNTER — OFFICE VISIT (OUTPATIENT)
Dept: INTERNAL MEDICINE CLINIC | Facility: CLINIC | Age: 61
End: 2021-04-16
Payer: COMMERCIAL

## 2021-04-16 VITALS
DIASTOLIC BLOOD PRESSURE: 60 MMHG | TEMPERATURE: 96.1 F | WEIGHT: 129.8 LBS | SYSTOLIC BLOOD PRESSURE: 118 MMHG | BODY MASS INDEX: 22.16 KG/M2 | HEIGHT: 64 IN

## 2021-04-16 DIAGNOSIS — J30.1 SEASONAL ALLERGIC RHINITIS DUE TO POLLEN: ICD-10-CM

## 2021-04-16 DIAGNOSIS — Z12.5 SCREENING PSA (PROSTATE SPECIFIC ANTIGEN): ICD-10-CM

## 2021-04-16 DIAGNOSIS — Z13.220 SCREENING FOR HYPERCHOLESTEROLEMIA: ICD-10-CM

## 2021-04-16 DIAGNOSIS — Z13.0 SCREENING, ANEMIA, DEFICIENCY, IRON: ICD-10-CM

## 2021-04-16 DIAGNOSIS — M25.561 ACUTE PAIN OF RIGHT KNEE: Primary | ICD-10-CM

## 2021-04-16 DIAGNOSIS — Z13.1 SCREENING FOR DIABETES MELLITUS: ICD-10-CM

## 2021-04-16 PROCEDURE — 99213 OFFICE O/P EST LOW 20 MIN: CPT | Performed by: INTERNAL MEDICINE

## 2021-04-16 RX ORDER — LEVOCETIRIZINE DIHYDROCHLORIDE 5 MG/1
5 TABLET, FILM COATED ORAL EVERY EVENING
Qty: 30 TABLET | Refills: 2 | Status: SHIPPED | OUTPATIENT
Start: 2021-04-16 | End: 2022-06-29

## 2021-04-16 RX ORDER — CELECOXIB 200 MG/1
200 CAPSULE ORAL DAILY PRN
Qty: 30 CAPSULE | Refills: 0 | Status: SHIPPED | OUTPATIENT
Start: 2021-04-16 | End: 2022-04-01

## 2021-04-16 NOTE — PROGRESS NOTES
Assessment/Plan:  The patient was seen and examined and noted to have issues with allergic symptoms and right knee pain  Problem List Items Addressed This Visit        Other    Acute pain of right knee - Primary    Relevant Medications    celecoxib (CeleBREX) 200 mg capsule      Other Visit Diagnoses     Seasonal allergic rhinitis due to pollen        Relevant Medications    celecoxib (CeleBREX) 200 mg capsule    levocetirizine (XYZAL) 5 MG tablet    Screening PSA (prostate specific antigen)        Relevant Orders    PSA, Total Screen    Screening for hypercholesterolemia        Relevant Orders    Lipid Panel with Direct LDL reflex    Screening for diabetes mellitus        Relevant Orders    Comprehensive metabolic panel    Screening, anemia, deficiency, iron        Relevant Orders    CBC and differential           Diagnoses and all orders for this visit:    Acute pain of right knee  -     celecoxib (CeleBREX) 200 mg capsule; Take 1 capsule (200 mg total) by mouth daily as needed for mild pain (right knee pain)    Seasonal allergic rhinitis due to pollen  -     levocetirizine (XYZAL) 5 MG tablet; Take 1 tablet (5 mg total) by mouth every evening    Screening PSA (prostate specific antigen)  -     PSA, Total Screen; Future    Screening for hypercholesterolemia  -     Lipid Panel with Direct LDL reflex; Future    Screening for diabetes mellitus  -     Comprehensive metabolic panel; Future    Screening, anemia, deficiency, iron  -     CBC and differential; Future        No problem-specific Assessment & Plan notes found for this encounter  Subjective: The patient was given robaxin for knee pain and notes it helped  He has not see ortho  Patient ID: Daniela Young is a 59 y o  male  The patient notes that he has problems with right knee pain  This has been present for several months and there was no insult or injury noted  He states that the pain is moderate    An xray noted moderate OA of the knee  He has issues with allergies and is looking for a better medication for this  The following portions of the patient's history were reviewed and updated as appropriate:   He has a past medical history of Kidney stone  ,  does not have any pertinent problems on file  ,   has a past surgical history that includes Kidney stone surgery  ,  family history includes No Known Problems in his father and mother  ,   reports that he has never smoked  He has never used smokeless tobacco  He reports previous alcohol use  He reports that he does not use drugs  ,  has No Known Allergies     Current Outpatient Medications   Medication Sig Dispense Refill    methocarbamol (ROBAXIN) 500 mg tablet Take 1 tablet (500 mg total) by mouth 4 (four) times a day as needed for muscle spasms (right knee pain) 90 tablet 0    celecoxib (CeleBREX) 200 mg capsule Take 1 capsule (200 mg total) by mouth daily as needed for mild pain (right knee pain) 30 capsule 0    levocetirizine (XYZAL) 5 MG tablet Take 1 tablet (5 mg total) by mouth every evening 30 tablet 2     No current facility-administered medications for this visit  Review of Systems   Constitutional: Negative for chills, fatigue and fever  HENT: Negative  Respiratory: Negative for cough, chest tightness and shortness of breath  Cardiovascular: Negative for chest pain and palpitations  Gastrointestinal: Negative for abdominal pain, constipation, diarrhea, nausea and vomiting  Genitourinary: Negative  Musculoskeletal: Negative for back pain and myalgias  Skin: Negative  Neurological: Negative  Psychiatric/Behavioral: Negative for dysphoric mood  The patient is not nervous/anxious  Objective:  Vitals:    04/16/21 1429   BP: 118/60   BP Location: Left arm   Patient Position: Sitting   Cuff Size: Standard   Temp: (!) 96 1 °F (35 6 °C)   Weight: 58 9 kg (129 lb 12 8 oz)   Height: 5' 4" (1 626 m)     Body mass index is 22 28 kg/m²       Physical Exam  Vitals signs and nursing note reviewed  Constitutional:       Appearance: He is well-developed  HENT:      Head: Normocephalic and atraumatic  Eyes:      Pupils: Pupils are equal, round, and reactive to light  Neck:      Musculoskeletal: Normal range of motion and neck supple  Cardiovascular:      Rate and Rhythm: Normal rate and regular rhythm  Heart sounds: Normal heart sounds  No murmur  Pulmonary:      Effort: Pulmonary effort is normal       Breath sounds: Normal breath sounds  No stridor  No rales  Abdominal:      General: Bowel sounds are normal  There is no distension  Palpations: Abdomen is soft  Tenderness: There is no abdominal tenderness  Musculoskeletal: Normal range of motion  General: Tenderness present  No deformity  Right knee: Tenderness found  Medial joint line tenderness noted  Skin:     General: Skin is warm and dry  Neurological:      Mental Status: He is alert and oriented to person, place, and time            PHQ-9 Depression Screening    PHQ-9:   Frequency of the following problems over the past two weeks:

## 2021-04-21 ENCOUNTER — OFFICE VISIT (OUTPATIENT)
Dept: OBGYN CLINIC | Facility: CLINIC | Age: 61
End: 2021-04-21
Payer: COMMERCIAL

## 2021-04-21 ENCOUNTER — OFFICE VISIT (OUTPATIENT)
Dept: PHYSICAL THERAPY | Facility: CLINIC | Age: 61
End: 2021-04-21
Payer: COMMERCIAL

## 2021-04-21 ENCOUNTER — TELEPHONE (OUTPATIENT)
Dept: OBGYN CLINIC | Facility: MEDICAL CENTER | Age: 61
End: 2021-04-21

## 2021-04-21 VITALS
BODY MASS INDEX: 22.02 KG/M2 | SYSTOLIC BLOOD PRESSURE: 146 MMHG | DIASTOLIC BLOOD PRESSURE: 67 MMHG | HEART RATE: 87 BPM | HEIGHT: 64 IN | TEMPERATURE: 98.2 F | WEIGHT: 129 LBS

## 2021-04-21 DIAGNOSIS — M25.561 ACUTE PAIN OF RIGHT KNEE: Primary | ICD-10-CM

## 2021-04-21 DIAGNOSIS — M17.11 PRIMARY OSTEOARTHRITIS OF RIGHT KNEE: ICD-10-CM

## 2021-04-21 DIAGNOSIS — R26.2 DIFFICULTY WALKING: ICD-10-CM

## 2021-04-21 DIAGNOSIS — M25.561 ACUTE PAIN OF RIGHT KNEE: ICD-10-CM

## 2021-04-21 DIAGNOSIS — Z12.11 ENCOUNTER FOR SCREENING COLONOSCOPY: Primary | ICD-10-CM

## 2021-04-21 PROCEDURE — 97112 NEUROMUSCULAR REEDUCATION: CPT

## 2021-04-21 PROCEDURE — 99204 OFFICE O/P NEW MOD 45 MIN: CPT | Performed by: FAMILY MEDICINE

## 2021-04-21 PROCEDURE — 20610 DRAIN/INJ JOINT/BURSA W/O US: CPT | Performed by: FAMILY MEDICINE

## 2021-04-21 PROCEDURE — 97110 THERAPEUTIC EXERCISES: CPT

## 2021-04-21 RX ORDER — METHYLPREDNISOLONE ACETATE 40 MG/ML
1 INJECTION, SUSPENSION INTRA-ARTICULAR; INTRALESIONAL; INTRAMUSCULAR; SOFT TISSUE
Status: COMPLETED | OUTPATIENT
Start: 2021-04-21 | End: 2021-04-21

## 2021-04-21 RX ORDER — LIDOCAINE HYDROCHLORIDE 10 MG/ML
4 INJECTION, SOLUTION INFILTRATION; PERINEURAL
Status: COMPLETED | OUTPATIENT
Start: 2021-04-21 | End: 2021-04-21

## 2021-04-21 RX ADMIN — LIDOCAINE HYDROCHLORIDE 4 ML: 10 INJECTION, SOLUTION INFILTRATION; PERINEURAL at 15:26

## 2021-04-21 RX ADMIN — METHYLPREDNISOLONE ACETATE 1 ML: 40 INJECTION, SUSPENSION INTRA-ARTICULAR; INTRALESIONAL; INTRAMUSCULAR; SOFT TISSUE at 15:26

## 2021-04-21 NOTE — PROGRESS NOTES
[address] [facility]  [address]  [facility] [id number] [phone]      Chief Complaint:  Chief Complaint   Patient presents with    Right Knee - Pain       Vitals:  /67   Pulse 87   Temp 98 2 °F (36 8 °C) (Tympanic)   Ht 5' 4" (1 626 m)   Wt 58 5 kg (129 lb)   BMI 22 14 kg/m²     The following portions of the patient's history were reviewed and updated as appropriate: allergies, current medications, past family history, past medical history, past social history, past surgical history, and problem list       Subjective:   Patient ID: Shaq Doyle is a 59 y o  male  Here c/o R knee pain  Started about 1 months ago  Denies injury  Swelling  Locks/gives out  Taking robaxin- helps a little  Taking tylenol helps a little  Aleve caused GI symptoms  Pain with walking  Better at rest     RIGHT KNEE     INDICATION:   M25 561: Pain in right knee      COMPARISON:  None     VIEWS:  XR KNEE 4+ VW RIGHT INJURY   Images: 4     FINDINGS:     There is no acute fracture or dislocation      There is no joint effusion      Mild degenerative changes seen in the medial compartment  Enthesopathic changes seen in the superior aspect     No lytic or blastic osseous lesion      Soft tissues are unremarkable      IMPRESSION:     Mild degenerative changes in the medial compartment  No acute displaced fracture         Review of Systems   Constitutional: Negative for fatigue and fever  Respiratory: Negative for shortness of breath  Cardiovascular: Negative for chest pain  Gastrointestinal: Negative for abdominal pain and nausea  Musculoskeletal: Positive for arthralgias, gait problem and joint swelling  Skin: Negative for rash and wound  Neurological: Negative for weakness and headaches  Objective:  Right Knee Exam     Muscle Strength   The patient has normal right knee strength      Tenderness   The patient is experiencing tenderness in the lateral joint line  Range of Motion   Extension: normal   Flexion: normal     Tests   Mello:  Lateral - positive  Varus: negative Valgus: negative    Other   Swelling: moderate  Effusion: effusion present          Observations     Right Knee   Positive for effusion  Physical Exam  Constitutional:       Appearance: Normal appearance  He is normal weight  Eyes:      Extraocular Movements: Extraocular movements intact  Neck:      Musculoskeletal: Normal range of motion  Pulmonary:      Effort: Pulmonary effort is normal    Musculoskeletal:         General: Swelling and tenderness present  Right knee: He exhibits effusion  Skin:     General: Skin is warm and dry  Neurological:      General: No focal deficit present  Mental Status: He is alert and oriented to person, place, and time  Mental status is at baseline  Psychiatric:         Mood and Affect: Mood normal          Behavior: Behavior normal          Thought Content: Thought content normal          Judgment: Judgment normal      Large joint arthrocentesis: R knee  Universal Protocol:  Consent: Verbal consent obtained  Risks and benefits: risks, benefits and alternatives were discussed  Consent given by: patient  Time out: Immediately prior to procedure a "time out" was called to verify the correct patient, procedure, equipment, support staff and site/side marked as required    Timeout called at: 4/21/2021 3:33 PM   Site marked: the operative site was marked  Supporting Documentation  Indications: pain   Procedure Details  Location: knee - R knee  Preparation: Patient was prepped and draped in the usual sterile fashion  Needle size: 25 G  Ultrasound guidance: no  Approach: anterolateral  Medications administered: 4 mL lidocaine 1 %; 1 mL methylPREDNISolone acetate 40 mg/mL    Aspirate amount: 15 mL  Aspirate: clear and blood-tinged    Patient tolerance: patient tolerated the procedure well with no immediate complications  Dressing: Sterile dressing applied            I have personally reviewed pertinent films in PACS and my interpretation is XR-  R knee- no fx, mild DJD  Assessment/Plan:  Assessment/Plan   Diagnoses and all orders for this visit:    Encounter for screening colonoscopy  -     Ambulatory referral for colon cancer education; Future    Primary osteoarthritis of right knee  -     Diclofenac Sodium (VOLTAREN) 1 %; Apply 2 g topically 3 (three) times a day as needed (knee pain)    Other orders  -     Large joint arthrocentesis: R knee        Return in about 1 week (around 4/28/2021) for Recheck       Jaquan Sousa MD

## 2021-04-21 NOTE — TELEPHONE ENCOUNTER
[facility] patients script Voltaren 1 % prescribed today  Patient is also taking Celebrex 200 mg from Dr Jerrell Simmsite for his knee  Should he stop taken the Celebrex? Please call the patient to advise       Nafisa Bass # [phone]

## 2021-04-21 NOTE — LETTER
April 21, 2021     Drea Christopher, [address] [facility] [address]    Patient: Pastor Malloy   YOB: 1960   Date of Visit: 4/21/2021       Dear Dr Misha Avendano: Thank you for referring José Antonio Payne to me for evaluation  Below are my notes for this consultation  If you have questions, please do not hesitate to call me  I look forward to following your patient along with you  Sincerely,        Vlad Cordoba MD        CC: No Recipients  Vlad Cordoba MD  4/21/2021  3:39 PM  Incomplete  [facility]  [address]  [facility] [address]  [phone]      Chief Complaint:  Chief Complaint   Patient presents with    Right Knee - Pain       Vitals:  /67   Pulse 87   Temp 98 2 °F (36 8 °C) (Tympanic)   Ht 5' 4" (1 626 m)   Wt 58 5 kg (129 lb)   BMI 22 14 kg/m²     The following portions of the patient's history were reviewed and updated as appropriate: allergies, current medications, past family history, past medical history, past social history, past surgical history, and problem list       Subjective:   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  Here c/o R knee pain  Started about 1 months ago  Denies injury  Swelling  Locks/gives out  Taking robaxin- helps a little  Taking tylenol helps a little  Aleve caused GI symptoms  Pain with walking  Better at rest     RIGHT KNEE     INDICATION:   M25 561: Pain in right knee      COMPARISON:  None     VIEWS:  XR KNEE 4+ VW RIGHT INJURY   Images: 4     FINDINGS:     There is no acute fracture or dislocation      There is no joint effusion      Mild degenerative changes seen in the medial compartment    Enthesopathic changes seen in the superior aspect     No lytic or blastic osseous lesion      Soft tissues are unremarkable      IMPRESSION:     Mild degenerative changes in the medial compartment  No acute displaced fracture         Review of Systems   Constitutional: Negative for fatigue and fever  Respiratory: Negative for shortness of breath  Cardiovascular: Negative for chest pain  Gastrointestinal: Negative for abdominal pain and nausea  Musculoskeletal: Positive for arthralgias, gait problem and joint swelling  Skin: Negative for rash and wound  Neurological: Negative for weakness and headaches  Objective:  Right Knee Exam     Muscle Strength   The patient has normal right knee strength  Tenderness   The patient is experiencing tenderness in the lateral joint line  Range of Motion   Extension: normal   Flexion: normal     Tests   Mello:  Lateral - positive  Varus: negative Valgus: negative    Other   Swelling: moderate  Effusion: effusion present          Observations     Right Knee   Positive for effusion  Physical Exam  Constitutional:       Appearance: Normal appearance  He is normal weight  Eyes:      Extraocular Movements: Extraocular movements intact  Neck:      Musculoskeletal: Normal range of motion  Pulmonary:      Effort: Pulmonary effort is normal    Musculoskeletal:         General: Swelling and tenderness present  Right knee: He exhibits effusion  Skin:     General: Skin is warm and dry  Neurological:      General: No focal deficit present  Mental Status: He is alert and oriented to person, place, and time  Mental status is at baseline  Psychiatric:         Mood and Affect: Mood normal          Behavior: Behavior normal          Thought Content: Thought content normal          Judgment: Judgment normal      Large joint arthrocentesis: R knee  Universal Protocol:  Consent: Verbal consent obtained    Risks and benefits: risks, benefits and alternatives were discussed  Consent given by: patient  Time out: Immediately prior to procedure a "time out" was called to verify the correct patient, procedure, equipment, support staff and site/side marked as required  Timeout called at: 4/21/2021 3:33 PM   Site marked: the operative site was marked  Supporting Documentation  Indications: pain   Procedure Details  Location: knee - R knee  Preparation: Patient was prepped and draped in the usual sterile fashion  Needle size: 25 G  Ultrasound guidance: no  Approach: anterolateral  Medications administered: 4 mL lidocaine 1 %; 1 mL methylPREDNISolone acetate 40 mg/mL    Aspirate amount: 15 mL  Aspirate: clear and blood-tinged    Patient tolerance: patient tolerated the procedure well with no immediate complications  Dressing:  Sterile dressing applied            I have personally reviewed pertinent films in PACS and my interpretation is XR-  R knee- no fx, mild DJD  Assessment/Plan:  Assessment/Plan   Diagnoses and all orders for this visit:    Encounter for screening colonoscopy  -     Ambulatory referral for colon cancer education; Future    Primary osteoarthritis of right knee  -     Diclofenac Sodium (VOLTAREN) 1 %; Apply 2 g topically 3 (three) times a day as needed (knee pain)    Other orders  -     Large joint arthrocentesis: R knee        Return in about 1 week (around 4/28/2021) for Recheck  MD Mindi Recinos MD  4/21/2021  3:26 PM  [address]  [address]  [facility] [address]  [phone]      Chief Complaint:  Chief Complaint   Patient presents with    Right Knee - Pain       Vitals:  /67   Pulse 87   Temp 98 2 °F (36 8 °C) (Tympanic)   Ht 5' 4" (1 626 m)   Wt 58 5 kg (129 lb)   BMI 22 14 kg/m²     The following portions of the patient's history were reviewed and updated as appropriate: allergies, current medications, past family history, past medical history, past social history, past surgical history, and problem list       Subjective:   Patient ID: Robby Lemons is a 59 y o  male      Here c/o R knee pain  Started about 1 months ago  Denies injury  Swelling  Locks/gives out  Taking robaxin- helps a little  Taking tylenol helps a little  Aleve caused GI symptoms  Pain with walking  Better at rest     RIGHT KNEE     INDICATION:   M25 561: Pain in right knee      COMPARISON:  None     VIEWS:  XR KNEE 4+ VW RIGHT INJURY   Images: 4     FINDINGS:     There is no acute fracture or dislocation      There is no joint effusion      Mild degenerative changes seen in the medial compartment  Enthesopathic changes seen in the superior aspect     No lytic or blastic osseous lesion      Soft tissues are unremarkable      IMPRESSION:     Mild degenerative changes in the medial compartment  No acute displaced fracture         Review of Systems   Constitutional: Negative for fatigue and fever  Respiratory: Negative for shortness of breath  Cardiovascular: Negative for chest pain  Gastrointestinal: Negative for abdominal pain and nausea  Musculoskeletal: Positive for arthralgias, gait problem and joint swelling  Skin: Negative for rash and wound  Neurological: Negative for weakness and headaches  Objective:  Right Knee Exam     Muscle Strength   The patient has normal right knee strength  Tenderness   The patient is experiencing tenderness in the lateral joint line  Range of Motion   Extension: normal   Flexion: normal     Tests   Mello:  Lateral - positive  Varus: negative Valgus: negative    Other   Swelling: moderate  Effusion: effusion present          Observations     Right Knee   Positive for effusion  Physical Exam  Constitutional:       Appearance: Normal appearance  He is normal weight  Eyes:      Extraocular Movements: Extraocular movements intact  Neck:      Musculoskeletal: Normal range of motion  Pulmonary:      Effort: Pulmonary effort is normal    Musculoskeletal:         General: Swelling and tenderness present  Right knee: He exhibits effusion  Skin:     General: Skin is warm and dry  Neurological:      General: No focal deficit present  Mental Status: He is alert and oriented to person, place, and time  Mental status is at baseline  Psychiatric:         Mood and Affect: Mood normal          Behavior: Behavior normal          Thought Content: Thought content normal          Judgment: Judgment normal      Large joint arthrocentesis: R knee  Universal Protocol:  Consent: Verbal consent obtained  Risks and benefits: risks, benefits and alternatives were discussed  Consent given by: patient  Time out: Immediately prior to procedure a "time out" was called to verify the correct patient, procedure, equipment, support staff and site/side marked as required  Timeout called at: 4/21/2021 3:21 PM   Site marked: the operative site was marked  Supporting Documentation  Indications: pain   Procedure Details  Location: knee - R knee  Preparation: Patient was prepped and draped in the usual sterile fashion  Needle size: 25 G  Ultrasound guidance: no  Approach: anterolateral  Medications administered: 4 mL lidocaine 1 %; 1 mL methylPREDNISolone acetate 40 mg/mL    Patient tolerance: patient tolerated the procedure well with no immediate complications  Dressing:  Sterile dressing applied            I have personally reviewed pertinent films in PACS and my interpretation is XR-  R knee- no fx, mild DJD  Assessment/Plan:  Assessment/Plan   Diagnoses and all orders for this visit:    Encounter for screening colonoscopy  -     Ambulatory referral for colon cancer education; Future        No follow-ups on file       Suri Holland MD

## 2021-04-21 NOTE — TELEPHONE ENCOUNTER
Spoke to patient and advised per the note that he should hold off on taking the celebrex at this time while using the voltaren gel as prescribed  Advised no OTC NSAIDs either  Only voltaren gel as prescribed  Patient verbalized understanding

## 2021-04-21 NOTE — PATIENT INSTRUCTIONS
F/u 1 wk  continue physical therapy    Icing/over the counter pain meds  Stop robaxin  Begin Voltaren gel  hold celebrex

## 2021-04-21 NOTE — PROGRESS NOTES
Daily Note     Today's date: 2021  Patient name: Alec Quiñones  : 1960  MRN: [id number]  Referring provider: COLBY Moore  Dx:   Encounter Diagnosis     ICD-10-CM    1  Acute pain of right knee  M25 561    2  Difficulty walking  R26 2                   Subjective: Patient states the pain is the same as last visit  Objective: See treatment diary below    Patient's home exercise program updated to include additional exercises  Handout issued and explained with demonstration  Patient issued red theraband for home use and accepts exercises  Assessment: Tolerated treatment well  Patient would benefit from continued PT for stretching and strengthening  Patient was able to add exercises to his program with little difficulty and no increase of pain  Patient seemed to understand all education on new exercises  Patient felt ok and had no increase of pain by the end of the session  Plan: Continue per plan of care  Progress treatment as tolerated         Precautions: none noted  Progress note:   POC:     Manuals               stretching        Patellar mobs                Neuro Re-Ed        QS  *:05 x10 B/L      SLB        Tandem stance        Fitter board        steamboat                        Ther Ex        Bike  *s=9 120/30 x5 min      SAQ        LAQ With ball squeeze :05x10 With ball squeeze :05x10      bridging        Leg raises With ER 10x With ER 10x R              clamshell  *red :[id number]      Hip ext in prone        HOIST  Knee ext  Knee flex          Leg press S=  Squat  HR        Standing HR/TR  *x10 ea      TKE        Standing hamstring curls  *:03x10 B/L                                      Calf stretch  *x3x :30 B/L      Hip flexor stretch  * 3x :30 R      Hamstring stretch Seated 3x:30 Seated  3x :30 B/L      Ther Activity        Step ups        Sit to stand        Mini squats                        Gait Training Modalities                        José Antonio Payne was given a handout and verbal instruction of customized home exercise program  Patient accepted program and was able to demonstrate exercises  Access Code: [email or web address]  URL: [email or web address]  Date: 04/14/2021; updated 4/21/21  Prepared by: Kim Mccollum    Exercises  Seated Long Arc Quad with Hip Adduction - 1 x daily - 7 x weekly - 10 reps - 3 sets  Straight Leg Raise with External Rotation - 1 x daily - 7 x weekly - 10 reps - 3 sets  Seated Hamstring Stretch - 3 x daily - 7 x weekly - 1 sets - 3 reps - 30 sec hold  Supine clam shells   Stand HR/TR  Stand hamstring curls  nhip flexor stretch  Quad set  Calf stretch

## 2021-04-28 ENCOUNTER — OFFICE VISIT (OUTPATIENT)
Dept: OBGYN CLINIC | Facility: CLINIC | Age: 61
End: 2021-04-28
Payer: COMMERCIAL

## 2021-04-28 ENCOUNTER — OFFICE VISIT (OUTPATIENT)
Dept: PHYSICAL THERAPY | Facility: CLINIC | Age: 61
End: 2021-04-28
Payer: COMMERCIAL

## 2021-04-28 VITALS
WEIGHT: 129 LBS | HEIGHT: 64 IN | SYSTOLIC BLOOD PRESSURE: 92 MMHG | BODY MASS INDEX: 22.02 KG/M2 | HEART RATE: 97 BPM | DIASTOLIC BLOOD PRESSURE: 59 MMHG

## 2021-04-28 DIAGNOSIS — M17.11 PRIMARY OSTEOARTHRITIS OF RIGHT KNEE: Primary | ICD-10-CM

## 2021-04-28 DIAGNOSIS — M25.561 ACUTE PAIN OF RIGHT KNEE: Primary | ICD-10-CM

## 2021-04-28 DIAGNOSIS — R26.2 DIFFICULTY WALKING: ICD-10-CM

## 2021-04-28 PROCEDURE — 97110 THERAPEUTIC EXERCISES: CPT | Performed by: PHYSICAL THERAPIST

## 2021-04-28 PROCEDURE — 97112 NEUROMUSCULAR REEDUCATION: CPT | Performed by: PHYSICAL THERAPIST

## 2021-04-28 PROCEDURE — 3008F BODY MASS INDEX DOCD: CPT | Performed by: FAMILY MEDICINE

## 2021-04-28 PROCEDURE — 99213 OFFICE O/P EST LOW 20 MIN: CPT | Performed by: FAMILY MEDICINE

## 2021-04-28 PROCEDURE — 1036F TOBACCO NON-USER: CPT | Performed by: FAMILY MEDICINE

## 2021-04-28 NOTE — PATIENT INSTRUCTIONS
F/u 6 wks  Continue therapy  Turmeric 500 mg 2x daily  Tart Cherry 1000 mg daily  Glucosamine chrondroitin triple strength 3x daily  Voltaren gel as needed  Hold Celebrex while using Voltaren gel  Icing as needed  Home exercises

## 2021-04-28 NOTE — PROGRESS NOTES
Daily Note     Today's date: 2021  Patient name: Reggie Patricia  : 1960  MRN: [id number]  Referring provider: COLBY Youssef  Dx:   Encounter Diagnosis     ICD-10-CM    1  Acute pain of right knee  M25 561    2  Difficulty walking  R26 2        Start Time: 1315  Stop Time: 1400  Total time in clinic (min): 45 minutes    Subjective: Saw ortho earlier today, to continue with PT Dx with OA  Objective: See treatment diary below      Assessment: Tolerated treatment well  Most pain during TKE and QS likely due to increased patellar contact  Patient would benefit from continued PT      Plan: Continue per plan of care  Progress treatment as tolerated  Precautions: none noted  Progress note:   POC:     Manuals              stretching        Patellar mobs                Neuro Re-Ed        QS  *:05 x10 B/L :05 x10     SLB        Tandem stance        Fitter board        steamboat                        Ther Ex        Bike  *s=9 120/30 x5 min s=9 120/30 x10 min     SAQ   30x     LAQ With ball squeeze :05x10 With ball squeeze :05x10 With ball 3x10     bridging        Leg raises With ER 10x With ER 10x R 3x10 with ER             clamshell  *red :[id number] 2x10 red     Hip ext in prone        HOIST  Knee ext  Knee flex          Leg press S=  Squat  HR        Standing HR/TR  *x10 ea 3x10     TKE        Standing hamstring curls  *:03x10 B/L 2x10                                     Calf stretch  *x3x :30 B/L 3x :30 B/L     Hip flexor stretch  * 3x :30 R      Hamstring stretch Seated 3x:30 Seated  3x :30 B/L Seated  3x :30 B/L     Ther Activity        Step ups        Sit to stand        Mini squats                        Gait Training                        Modalities                        José Antonio Payne was given a handout and verbal instruction of customized home exercise program  Patient accepted program and was able to demonstrate exercises  Access Code: [email or web address]  URL: [email or web address]  Date: 04/14/2021; updated 4/21/21  Prepared by: Demi Jim    Exercises  Seated Long Arc Quad with Hip Adduction - 1 x daily - 7 x weekly - 10 reps - 3 sets  Straight Leg Raise with External Rotation - 1 x daily - 7 x weekly - 10 reps - 3 sets  Seated Hamstring Stretch - 3 x daily - 7 x weekly - 1 sets - 3 reps - 30 sec hold  Supine clam shells   Stand HR/TR  Stand hamstring curls  nhip flexor stretch  Quad set  Calf stretch

## 2021-04-28 NOTE — PROGRESS NOTES
[facility]  [address]  [address] [id number] [phone]      Chief Complaint:  Chief Complaint   Patient presents with    Right Knee - Follow-up       Vitals:  BP 92/59   Pulse 97   Ht 5' 4" (1 626 m)   Wt 58 5 kg (129 lb)   BMI 22 14 kg/m²     The following portions of the patient's history were reviewed and updated as appropriate: allergies, current medications, past family history, past medical history, past social history, past surgical history, and problem list       Subjective:   Patient ID: Jamal Knox is a 59 y o  male  Here for f/u  R knee pain  No swelling  Some apin but better than before  Injection helped  Using voltaren which helps a little  Started PT  Taking tylenol helps a little  Aleve caused GI symptoms  Pain with prolonged alking  Better at rest          Review of Systems   Constitutional: Negative for fatigue and fever  Respiratory: Negative for shortness of breath  Cardiovascular: Negative for chest pain  Gastrointestinal: Negative for abdominal pain and nausea  Genitourinary: Negative for dysuria  Musculoskeletal: Positive for arthralgias and gait problem  Skin: Negative for rash and wound  Neurological: Negative for weakness and headaches  Objective:  Right Knee Exam     Tenderness   The patient is experiencing tenderness in the lateral joint line  Range of Motion   Extension: normal   Flexion: normal     Other   Swelling: mild  Effusion: effusion present          Observations     Right Knee   Positive for effusion  Physical Exam  Constitutional:       Appearance: Normal appearance  He is normal weight  Eyes:      Extraocular Movements: Extraocular movements intact  Neck:      Musculoskeletal: Normal range of motion  Pulmonary:      Effort: Pulmonary effort is normal    Musculoskeletal:         General: Tenderness present  Right knee: He exhibits effusion  Skin:     General: Skin is warm and dry  Neurological:      General: No focal deficit present  Mental Status: He is alert and oriented to person, place, and time  Mental status is at baseline  Psychiatric:         Mood and Affect: Mood normal          Behavior: Behavior normal          Thought Content: Thought content normal          Judgment: Judgment normal                Assessment/Plan:  Assessment/Plan   Diagnoses and all orders for this visit:    Primary osteoarthritis of right knee        Return in about 6 weeks (around 6/9/2021) for Recheck       Kathleen Antoine MD

## 2021-04-30 ENCOUNTER — OFFICE VISIT (OUTPATIENT)
Dept: PHYSICAL THERAPY | Facility: CLINIC | Age: 61
End: 2021-04-30
Payer: COMMERCIAL

## 2021-04-30 DIAGNOSIS — M25.561 ACUTE PAIN OF RIGHT KNEE: Primary | ICD-10-CM

## 2021-04-30 DIAGNOSIS — R26.2 DIFFICULTY WALKING: ICD-10-CM

## 2021-04-30 PROCEDURE — 97110 THERAPEUTIC EXERCISES: CPT

## 2021-04-30 PROCEDURE — 97530 THERAPEUTIC ACTIVITIES: CPT

## 2021-04-30 PROCEDURE — 97112 NEUROMUSCULAR REEDUCATION: CPT

## 2021-04-30 NOTE — PROGRESS NOTES
Daily Note     Today's date: 2021  Patient name: Sandra Saleem  : 1960  MRN: [id number]  Referring provider: COLBY Lambert  Dx:   Encounter Diagnosis     ICD-10-CM    1  Acute pain of right knee  M25 561    2  Difficulty walking  R26 2                   Subjective: Patient showed late for today's appointment  Patient states his right knee hurts a "little" today  He feels exercises are going well at home  Objective: See treatment diary below    Patient's home exercise program was updated to include additional exercises  Handout issued and explained with demonstration  Patient accepts new exercise    Assessment: Tolerated treatment well  Patient would benefit from continued PT for stretching and strengthening  Patient was able to add and increase exercises in his program during today's session  He needed verbal and demonstrations to perform his exercises correctly  Patient has quick motions and needed cues to slow exercises  Patient seemed to understand all education given during today's session  Patient felt his pain level stayed the same by the end of the session (10 pain)  Plan: Continue per plan of care  Progress treatment as tolerated         Precautions: none noted  Progress note:   POC:     Manuals             stretching        Patellar mobs                Neuro Re-Ed        QS  *:05 x10 B/L :05 x10 :05 2x10    SLB        Tandem stance        Fitter board    *1H x10ea    steamboat                        Ther Ex        Bike  *s=9 120/30 x5 min s=9 120/30 x10 min S= 9 115/30 x10 min    SAQ   30x x30    LAQ With ball squeeze :05x10 With ball squeeze :05x10 With ball 3x10 With ball 3x10    bridging        Leg raises With ER 10x With ER 10x R 3x10 with ER 3x10 with ER            clamshell  *red :[id number] 2x10 red Red 3x10    Hip ext in prone        HOIST  Knee ext  Knee flex          Leg press S=  Squat  HR        Standing HR/TR  *x10 ea 3x10 Stand 3x10    TKE        Standing hamstring curls  *:03x10 B/L 2x10 :03 2x10 B/L                                    Calf stretch  *x3x :30 B/L 3x :30 B/L Wedge :30x3    Hip flexor stretch  * 3x :30 R  Prone ;30x3 R    Hamstring stretch Seated 3x:30 Seated  3x :30 B/L Seated  3x :30 B/L Seated  3x :30    Ther Activity        Step ups    * x10 fwd/ lat    Sit to stand    *x10    Mini squats                        Gait Training                        Modalities                        José Antonio Payne was given a handout and verbal instruction of customized home exercise program  Patient accepted program and was able to demonstrate exercises  Access Code: [email or web address]  URL: [email or web address]  Date: 04/14/2021; updated 4/21/21, 4/30  Prepared by: Lysle Furnace    Exercises  Seated Long Arc Quad with Hip Adduction - 1 x daily - 7 x weekly - 10 reps - 3 sets  Straight Leg Raise with External Rotation - 1 x daily - 7 x weekly - 10 reps - 3 sets  Seated Hamstring Stretch - 3 x daily - 7 x weekly - 1 sets - 3 reps - 30 sec hold  Supine clam shells   Stand HR/TR  Stand hamstring curls  hip flexor stretch  Quad set  Calf stretch  Sit to stand

## 2021-05-05 ENCOUNTER — OFFICE VISIT (OUTPATIENT)
Dept: PHYSICAL THERAPY | Facility: CLINIC | Age: 61
End: 2021-05-05
Payer: COMMERCIAL

## 2021-05-05 DIAGNOSIS — M25.561 ACUTE PAIN OF RIGHT KNEE: Primary | ICD-10-CM

## 2021-05-05 DIAGNOSIS — R26.2 DIFFICULTY WALKING: ICD-10-CM

## 2021-05-05 PROCEDURE — 97530 THERAPEUTIC ACTIVITIES: CPT

## 2021-05-05 PROCEDURE — 97110 THERAPEUTIC EXERCISES: CPT

## 2021-05-05 PROCEDURE — 97112 NEUROMUSCULAR REEDUCATION: CPT

## 2021-05-05 NOTE — PROGRESS NOTES
Daily Note     Today's date: 2021  Patient name: Reno Marquez  : 1960  MRN: [id number]  Referring provider: COLBY Pendleton  Dx:   Encounter Diagnosis     ICD-10-CM    1  Acute pain of right knee  M25 561    2  Difficulty walking  R26 2                   Subjective: Patient feels his knee is improving  He has only a little bit of pain in the knee  Objective: See treatment diary below      Assessment: Tolerated treatment well  Patient would benefit from continued PT for stretching and strengthening  Patient was able to increase and add exercises to his program with little difficulty and no increase of pain  Verbal and tactile cues needed to perform exercises correctly at times  Patient felt ok by the end of the session  Plan: Continue per plan of care  Progress treatment as tolerated         Precautions: none noted  Progress note:   POC:     Manuals            stretching        Patellar mobs                Neuro Re-Ed        QS  *:05 x10 B/L :05 x10 :05 2x10 :05x30   SLB        Tandem stance     *:30x2 B/L   Fitter board    *1H x10ea x15 ea   steamboat                        Ther Ex        Bike  *s=9 120/30 x5 min s=9 120/30 x10 min S= 9 115/30 x10 min S=8 110/70 x10 min   SAQ   30x x30 1# x20   LAQ With ball squeeze :05x10 With ball squeeze :05x10 With ball 3x10 With ball 3x10 With ball 3x10 B/L   bridging        Leg raises With ER 10x With ER 10x R 3x10 with ER 3x10 with ER 3x10 w/ ER           clamshell  *red :[id number] 2x10 red Red 3x10 Red x30   Hip ext in prone        HOIST  Knee ext  Knee flex          Leg press S=  Squat  HR        Standing HR/TR  *x10 ea 3x10 Stand 3x10 Stand x30 ea   TKE        Standing hamstring curls  *:03x10 B/L 2x10 :03 2x10 B/L x30                                    Calf stretch  *x3x :30 B/L 3x :30 B/L Wedge :30x3 Wedge ;30x3   Hip flexor stretch  * 3x :30 R  Prone :30x3 R Strap R :30x3   Hamstring stretch Seated 3x:30 Seated  3x :30 B/L Seated  3x :30 B/L Seated  3x :30 Seated 3x :30   Ther Activity        Step ups    * x10 fwd/ lat x10 fwd/ lat   Sit to stand    *x10 x10   Mini squats                        Gait Training                        Modalities                        José Antonio Payne was given a handout and verbal instruction of customized home exercise program  Patient accepted program and was able to demonstrate exercises  Access Code: [email or web address]  URL: [email or web address]  Date: 04/14/2021; updated 4/21/21, 4/30  Prepared by: Phuong Resendiz    Exercises  Seated Long Arc Quad with Hip Adduction - 1 x daily - 7 x weekly - 10 reps - 3 sets  Straight Leg Raise with External Rotation - 1 x daily - 7 x weekly - 10 reps - 3 sets  Seated Hamstring Stretch - 3 x daily - 7 x weekly - 1 sets - 3 reps - 30 sec hold  Supine clam shells   Stand HR/TR  Stand hamstring curls  hip flexor stretch  Quad set  Calf stretch  Sit to stand

## 2021-05-07 ENCOUNTER — OFFICE VISIT (OUTPATIENT)
Dept: PHYSICAL THERAPY | Facility: CLINIC | Age: 61
End: 2021-05-07
Payer: COMMERCIAL

## 2021-05-07 DIAGNOSIS — R26.2 DIFFICULTY WALKING: ICD-10-CM

## 2021-05-07 DIAGNOSIS — M25.561 ACUTE PAIN OF RIGHT KNEE: Primary | ICD-10-CM

## 2021-05-07 PROCEDURE — 97112 NEUROMUSCULAR REEDUCATION: CPT | Performed by: PHYSICAL THERAPIST

## 2021-05-07 PROCEDURE — 97110 THERAPEUTIC EXERCISES: CPT | Performed by: PHYSICAL THERAPIST

## 2021-05-07 NOTE — PROGRESS NOTES
Daily Note     Today's date: 2021  Patient name: Melanie Moore  : 1960  MRN: [id number]  Referring provider: COLBY Hawkins  Dx:   Encounter Diagnosis     ICD-10-CM    1  Acute pain of right knee  M25 561    2  Difficulty walking  R26 2        Start Time: 1050  Stop Time: 1135  Total time in clinic (min): 45 minutes    Subjective: States his knee has been feeling a little better, PQ today 4/10  Objective: See treatment diary below      Assessment: Tolerated treatment well  Patient would benefit from continued PT      Plan: Continue per plan of care  Progress treatment as tolerated         Precautions: none noted  Progress note:   POC:     Manuals            stretching        Patellar mobs                Neuro Re-Ed        QS :05 x10  :05 x10 :05 2x10 :05x30   SLB        Tandem stance     *:30x2 B/L   Fitter board    *1H x10ea x15 ea   steamboat                        Ther Ex        Bike S=8 110/70 x10 min  s=9 120/30 x10 min S= 9 115/30 x10 min S=8 110/70 x10 min   SAQ With ball 30x  30x x30 1# x20   LAQ With ball 3x10  With ball 3x10 With ball 3x10 With ball 3x10 B/L   bridging        Leg raises 3x10 w/ ER  3x10 with ER 3x10 with ER 3x10 w/ ER           clamshell Red 30x bilat side lying  2x10 red Red 3x10 Red x30   Hip ext in prone        HOIST  Knee ext  Knee flex          Leg press S=  Squat  HR        Standing HR/TR Stand x30 ea  3x10 Stand 3x10 Stand x30 ea   TKE        Standing hamstring curls   2x10 :03 2x10 B/L x30                                    Calf stretch   3x :30 B/L Wedge :30x3 Wedge ;30x3   Hip flexor stretch    Prone :30x3 R Strap R :30x3   Hamstring stretch   Seated  3x :30 B/L Seated  3x :30 Seated 3x :30   Ther Activity        Step ups    * x10 fwd/ lat x10 fwd/ lat   Sit to stand    *x10 x10   Mini squats                        Gait Training                        Modalities                        José Antonio Payne was given a handout and verbal instruction of customized home exercise program  Patient accepted program and was able to demonstrate exercises  Access Code: [email or web address]  URL: [email or web address]  Date: 2021; updated 21,   Prepared by: Sarina Clement    Exercises  Seated Long Arc Quad with Hip Adduction - 1 x daily - 7 x weekly - 10 reps - 3 sets  Straight Leg Raise with External Rotation - 1 x daily - 7 x weekly - 10 reps - 3 sets  Seated Hamstring Stretch - 3 x daily - 7 x weekly - 1 sets - 3 reps - 30 sec hold  Supine clam shells   Stand HR/TR  Stand hamstring curls  hip flexor stretch  Quad set  Calf stretch  Sit to stand                 Daily Note     Today's date: 2021  Patient name: Lam Mojica  : 1960  MRN: [id number]  Referring provider: COLBY Samuel  Dx:   Encounter Diagnosis     ICD-10-CM    1  Acute pain of right knee  M25 561    2  Difficulty walking  R26 2        Start Time: 1050  Stop Time: 1135  Total time in clinic (min): 45 minutes    Subjective: Patient states knee has bee feeling a little better, 4/10 PQ  Objective: See treatment diary below      Assessment: Tolerated treatment well  Progressing well towards goals with reported improvement in function  Patient would benefit from continued PT working on RLE strength and stability  Plan: Continue per plan of care  Progress treatment as tolerated         Precautions: none noted  Progress note:   POC:     Manuals            stretching        Patellar mobs                Neuro Re-Ed        QS :05 x10 *:05 x10 B/L :05 x10 :05 2x10 :05x30   SLB        Tandem stance :30x2 B/L    *:30x2 B/L   Fitter board 10x ea   *1H x10ea x15 ea   steamboat                        Ther Ex        Bike S=8 110/70 x10 min *s=9 120/30 x5 min s=9 120/30 x10 min S= 9 115/30 x10 min S=8 110/70 x10 min   SAQ   30x x30 1# x20   LAQ With ball squeeze :05x10 With ball squeeze :05x10 With ball 3x10 With ball 3x10 With ball 3x10 B/L   bridging        Leg raises With ER 10x With ER 10x R 3x10 with ER 3x10 with ER 3x10 w/ ER           clamshell Red x30 bilat *red :[id number] 2x10 red Red 3x10 Red x30   Hip ext in prone        HOIST  Knee ext  Knee flex          Leg press S=  Squat  HR        Standing HR/TR 30x ea *x10 ea 3x10 Stand 3x10 Stand x30 ea   TKE        Standing hamstring curls 30x  *:03x10 B/L 2x10 :03 2x10 B/L x30                                    Calf stretch Wedge :30x3 *x3x :30 B/L 3x :30 B/L Wedge :30x3 Wedge ;30x3   Hip flexor stretch  * 3x :30 R  Prone :30x3 R Strap R :30x3   Hamstring stretch Seated 3x:30 Seated  3x :30 B/L Seated  3x :30 B/L Seated  3x :30 Seated 3x :30   Ther Activity        Step ups x10 fwd/ lat   * x10 fwd/ lat x10 fwd/ lat   Sit to stand    *x10 x10   Mini squats                        Gait Training                        Modalities                        José Antonio Payne was given a handout and verbal instruction of customized home exercise program  Patient accepted program and was able to demonstrate exercises  Access Code: [email or web address]  URL: [email or web address]  Date: 04/14/2021; updated 4/21/21, 4/30  Prepared by: Rogelio Moreira    Exercises  Seated Long Arc Quad with Hip Adduction - 1 x daily - 7 x weekly - 10 reps - 3 sets  Straight Leg Raise with External Rotation - 1 x daily - 7 x weekly - 10 reps - 3 sets  Seated Hamstring Stretch - 3 x daily - 7 x weekly - 1 sets - 3 reps - 30 sec hold  Supine clam shells   Stand HR/TR  Stand hamstring curls  hip flexor stretch  Quad set  Calf stretch  Sit to stand

## 2021-05-12 ENCOUNTER — OFFICE VISIT (OUTPATIENT)
Dept: PHYSICAL THERAPY | Facility: CLINIC | Age: 61
End: 2021-05-12
Payer: COMMERCIAL

## 2021-05-12 DIAGNOSIS — R26.2 DIFFICULTY WALKING: ICD-10-CM

## 2021-05-12 DIAGNOSIS — M25.561 ACUTE PAIN OF RIGHT KNEE: Primary | ICD-10-CM

## 2021-05-12 PROCEDURE — 97112 NEUROMUSCULAR REEDUCATION: CPT

## 2021-05-12 PROCEDURE — 97110 THERAPEUTIC EXERCISES: CPT

## 2021-05-12 PROCEDURE — 97530 THERAPEUTIC ACTIVITIES: CPT

## 2021-05-12 NOTE — PROGRESS NOTES
Daily Note     Today's date: 2021  Patient name: Sai Otto  : 1960  MRN: [id number]  Referring provider: COLBY Taveras  Dx:   Encounter Diagnosis     ICD-10-CM    1  Acute pain of right knee  M25 561    2  Difficulty walking  R26 2                   Subjective: Patient reports no new complaints at this time  Objective: See treatment diary below      Assessment: Patient did well with new TE added today, reports no increased pain only fatigue  Plan: Continue per plan of care        Precautions: none noted  Progress note:   POC:     Manuals            stretching        Patellar mobs                Neuro Re-Ed        QS :05 x10   :05 2x10 :05x30   SLB        Tandem stance     *:30x2 B/L   Fitter board    *1H x10ea x15 ea   ste[place]oat                        Ther Ex        Bike S=8 110/70 x10 min S8 110/70x10  S= 9 115/30 x10 min S=8 110/70 x10 min   SAQ With ball 30x 3# 3x10  x30 1# x20   LAQ With ball 3x10 3x10  With ball 3x10 With ball 3x10 B/L   bridging  RTB 2x10      Leg raises 3x10 w/ ER Flex/abd 3x10 ea  3x10 with ER 3x10 w/ ER           clamshell Red 30x bilat side lying Red 30x bilat sidelying  Red 3x10 Red x30   Hip ext in prone        HOIST  Knee ext  Knee flex          Leg press S=  Squat  HR        Standing HR/TR Stand x30 ea Stand x30ea  Stand 3x10 Stand x30 ea   TKE        Standing hamstring curls    :03 2x10 B/L x30    TB sidestepping/monster walk  RTB 2 laps ea      Sit to stand   2x10                      Calf stretch    Wedge :30x3 Wedge ;30x3   Hip flexor stretch    Prone :30x3 R Strap R :30x3   Hamstring stretch    Seated  3x :30 Seated 3x :30   Ther Activity        Step ups    * x10 fwd/ lat x10 fwd/ lat   Sit to stand    *x10 x10   Mini squats                        Gait Training                        Modalities                        José Antonio Payne was given a handout and verbal instruction of customized home exercise program  Patient accepted program and was able to demonstrate exercises  Access Code: [email or web address]  URL: [email or web address]  Date: 04/14/2021; updated 4/21/21, 4/30  Prepared by: Peng Ritter    Exercises  Seated Long Arc Quad with Hip Adduction - 1 x daily - 7 x weekly - 10 reps - 3 sets  Straight Leg Raise with External Rotation - 1 x daily - 7 x weekly - 10 reps - 3 sets  Seated Hamstring Stretch - 3 x daily - 7 x weekly - 1 sets - 3 reps - 30 sec hold  Supine clam shells   Stand HR/TR  Stand hamstring curls  hip flexor stretch  Quad set  Calf stretch  Sit to stand

## 2021-05-14 ENCOUNTER — APPOINTMENT (OUTPATIENT)
Dept: PHYSICAL THERAPY | Facility: CLINIC | Age: 61
End: 2021-05-14
Payer: COMMERCIAL

## 2021-05-21 ENCOUNTER — EVALUATION (OUTPATIENT)
Dept: PHYSICAL THERAPY | Facility: CLINIC | Age: 61
End: 2021-05-21
Payer: COMMERCIAL

## 2021-05-21 DIAGNOSIS — M25.561 ACUTE PAIN OF RIGHT KNEE: Primary | ICD-10-CM

## 2021-05-21 DIAGNOSIS — R26.2 DIFFICULTY WALKING: ICD-10-CM

## 2021-05-21 PROCEDURE — 97110 THERAPEUTIC EXERCISES: CPT | Performed by: PHYSICAL THERAPIST

## 2021-05-21 PROCEDURE — 97112 NEUROMUSCULAR REEDUCATION: CPT | Performed by: PHYSICAL THERAPIST

## 2021-05-21 PROCEDURE — 97530 THERAPEUTIC ACTIVITIES: CPT | Performed by: PHYSICAL THERAPIST

## 2021-05-21 NOTE — PROGRESS NOTES
PT Re-Evaluation     Today's date: 2021  Patient name: Robby Lemons  : 1960  MRN: [id number]  Referring provider: COLBY Farmer  Dx:   Encounter Diagnosis     ICD-10-CM    1  Acute pain of right knee  M25 561    2  Difficulty walking  R26 2        Start Time: 08  Stop Time: 09  Total time in clinic (min): 50 minutes    Assessment/Plan   Assessment details: José Antonio Payne has been seen in outpatient PT for 8 sessions beginning 21  Patient is a 59 y o  male with diagnosis of right knee pain and past medical history significant for kidney stone surgery, OA  FOTO shows functional improvement from 47% to 58% today  Overall patient is progressing well with hip and knee strength as well as decreased pain  Does continue to display slight hip weakness into abduction and extension as well as tightness of hamstring and c/o pain at pes anserine  Continuation of skilled PT is indicated working on general LE strengthening progressing to [facility] as well as hamstring flexibility for return to prior level of function  Impairments: abnormal gait, abnormal muscle firing, abnormal muscle tone, abnormal or restricted ROM, abnormal movement, activity intolerance, impaired balance, impaired physical strength, lacks appropriate home exercise program, pain with function and safety issue  Functional limitations: walking, stairs, squatting, lunging, lifting, kneeling  Goals  STG (6 weeks)  1  Patient will have reported 0/10 pain in right knee at rest  - PROGRESSING  2  Improve patient's right LE hamstring length by 10 degrees for increased ability to forward bend  - NOT MET  3  Increase patient's right single leg balance to 5 seconds for increased stability on stairs  - MET  LTG (12 weeks)  1  Patient's LE strength will be equal bilaterally for ability to ambulate and return to functional activities at OF  - PROGRESSING  2   Patient will be able to perform sit to stand transfer with 0/10 pain in right knee  - PROGRESSING  3  Patient will be independent with home exercise program for continued maintenance post PT discharge  - PROGRESSING      Plan  Plan details: To continue with PT 1x/week for 3-4 weeks and likely DC to HEP after that time  Patient would benefit from: skilled physical therapy  Planned modality interventions: unattended electrical stimulation, thermotherapy: hydrocollator packs and cryotherapy  Planned therapy interventions: manual therapy, neuromuscular re-education, self care, home exercise program, gait training, therapeutic exercise and therapeutic activities  Frequency: 1x week  Duration in weeks: 12  Plan of Care beginning date: 2021  Plan of Care expiration date: 2021  Treatment plan discussed with: patient and PTA        Subjective   History of Present Illness  Date of onset: 3/14/2021  Subjective : Patient states decrease in pain level since the start of PT  Is feeling better, but does continue to have constant pain in right knee  Has seen sports medicine for knee, instructed to continue PT and work on HEP as well as take glucosamine chondroitin daily  C/o sharp pain with prolonged walking and navigating stairs located at medial and lateral right knee  Mechanism of injury: José Antonio Payne is a 59 y o  male who presents to outpatient Physical Therapy today with complaints of right knee pain  States pain began approximately 1 month ago of insidious onset  Now is having difficulty with navigating stairs, walking, and transfers        X-ray IMPRESSION:     Mild degenerative changes in the medial compartment  No acute displaced fracture    Pain      Current pain ratin  3  At best pain ratin  3  At worst pain rating: 10 4  Location: anterior right knee  Quality: sharp  Aggravating factors: stair climbing, walking, standing and lifting  Progression: worsening    Social Support  Steps to enter house: yes  Stairs in house: no   Lives in: one-story house  Lives with: spouse    Employment status: working ([employer] (prolonged standing, lifting, walking))  Patient Goals  Patient goals for therapy: decreased pain  Patient goal: work pain free, navigate stairs normally  Does not want surgery if indicated, but would like to return to OF    Objective   Palpation     Right   Tenderness of the distal biceps femoris, rectus femoris and vastus lateralis  Tenderness     Right Knee   Tenderness in the ITB, lateral joint line, lateral patella, medial patella and superior patella  No tenderness in the medial joint line  Neurological Testing     Sensation     Knee   Left Knee   Intact: light touch    Right Knee   Intact: light touch     Active Range of Motion   Left Knee   Flexion: 138 degrees   Extension: 9 degrees     Right Knee   Flexion: 136 degrees   Extension: 10 degrees with pain    Mobility   Patellar Mobility:     Right Knee   Hypomobile: medial, lateral, superior and inferior     Strength/Myotome Testing  5/21    Left Hip   Planes of Motion   Flexion: 5  Extension: 4-  Abduction: 4+    Right Hip   Planes of Motion   Flexion: 4+  Extension: 3+    4+  Abduction: 4    4+    Left Knee   Normal strength    Right Knee   Flexion: 5    pain  Extension: 4    5  Quadriceps contraction: fair  good    Tests     Right Knee   Positive patellar compression  Negative anterior drawer, anterior Lachman, lateral Mello, medial Mello, Thessaly's test at 5 degrees, Thessaly's test at 20 degrees, valgus stress test at 0 degrees, valgus stress test at 30 degrees, varus stress test at 0 degrees and varus stress test at 30 degrees       Additional Tests Details  Hamstring 90/90 SLR R=(35) L= (30)  5/21: R=(35)  (-) ely's bilaterally    General Comments:      Knee Comments  Gait: antalgic, decreased stance on RLE    FOTO: 47% function, 61% predicted function   5/21: 58%           Precautions: none noted  Progress note: 6/21  POC: 7/14    Manuals 5/7 5/12 5/21 5/5 stretching        Patellar mobs                Neuro Re-Ed        QS :05 x10  HEP  :05x30   SLB        Tandem stance     *:30x2 B/L   Fitter board   15x ea with stops  x15 ea   steamboat                        Ther Ex        Bike S=8 110/70 x10 min S8 110/70x10 True bike L1 10 min  S=8 110/70 x10 min   SAQ With ball 30x 3# 3x10 prn  1# x20   LAQ With ball 3x10 3x10 1# with ball 3x10  With ball 3x10 B/L   bridging  RTB 2x10 nv     Leg raises 3x10 w/ ER Flex/abd 3x10 ea Flex with ER 3x10  3x10 w/ ER           clamshell Red 30x bilat side lying Red 30x bilat sidelying   Red x30   Hip ext in prone        HOIST  Knee ext  Knee flex          Leg press S=6  Squat  HR     55# 2x10     Standing HR/TR Stand x30 ea Stand x30ea nv  Stand x30 ea   TKE        Standing hamstring curls     x30    TB sidestepping/monster walk  RTB 2 laps ea *     Sit to stand   2x10 *                     Calf stretch     Wedge ;30x3   Hip flexor stretch     Strap R :30x3   Hamstring stretch   Seated 3x :30 * Seated 3x :30   Ther Activity        Step ups   10x fwd, lat  x10 fwd/ lat   Sit to stand     x10   Mini squats   *                     Gait Training                        Modalities                        José Antonio Payne was given a handout and verbal instruction of customized home exercise program  Patient accepted program and was able to demonstrate exercises  Access Code: [email or web address]  URL: [email or web address]  Date: 04/14/2021; updated 4/21/21, 4/30  Prepared by: Chandni Wilkins    Exercises  Seated Long Arc Quad with Hip Adduction - 1 x daily - 7 x weekly - 10 reps - 3 sets  Straight Leg Raise with External Rotation - 1 x daily - 7 x weekly - 10 reps - 3 sets  Seated Hamstring Stretch - 3 x daily - 7 x weekly - 1 sets - 3 reps - 30 sec hold  Supine clam shells   Stand HR/TR  Stand hamstring curls  hip flexor stretch  Quad set  Calf stretch  Sit to stand

## 2021-06-02 ENCOUNTER — OFFICE VISIT (OUTPATIENT)
Dept: PHYSICAL THERAPY | Facility: CLINIC | Age: 61
End: 2021-06-02
Payer: COMMERCIAL

## 2021-06-02 DIAGNOSIS — R26.2 DIFFICULTY WALKING: ICD-10-CM

## 2021-06-02 DIAGNOSIS — M25.561 ACUTE PAIN OF RIGHT KNEE: Primary | ICD-10-CM

## 2021-06-02 PROCEDURE — 97530 THERAPEUTIC ACTIVITIES: CPT

## 2021-06-02 PROCEDURE — 97112 NEUROMUSCULAR REEDUCATION: CPT

## 2021-06-02 PROCEDURE — 97110 THERAPEUTIC EXERCISES: CPT

## 2021-06-02 NOTE — PROGRESS NOTES
Daily Note     Today's date: 2021  Patient name: Jaqueline Jurado  : 1960  MRN: [id number]  Referring provider: COLBY Jain  Dx:   Encounter Diagnosis     ICD-10-CM    1  Acute pain of right knee  M25 561    2  Difficulty walking  R26 2        Start Time: 1035          Subjective: Patient states some days he feels ok and others days his knee just grabs with pain  He never knows what the knee will do  Patient reports doing his exercises regularly at home  Objective: See treatment diary below    Patient's home exercise program updated to include additional exercises  Handout issued and explained with demonstration  Patient accepts new exercise  Assessment: Tolerated treatment well  Patient would benefit from continued PT for stretches and strengthening  Patient was able to add exercise to his program with little difficulty and no pain  Patient felt ok by the end of the session  Plan: Continue per plan of care  Progress note during next visit  Potential discharge next visit       Precautions: none noted  Progress note:   POC:     Manuals             stretching        Patellar mobs                Neuro Re-Ed        QS :05 x10  HEP     SLB        Tandem stance        Fitter board   15x ea with stops 15x ea with stops    steamboat                        Ther Ex        Bike S=8 110/70 x10 min S8 110/70x10 True bike L1 10 min True bike L1 10 min    SAQ With ball 30x 3# 3x10 prn     LAQ With ball 3x10 3x10 1# with ball 3x10 B/L 1# 3x10 w/ball    bridging  RTB 2x10 nv RTB 2x10    Leg raises 3x10 w/ ER Flex/abd 3x10 ea Flex with ER 3x10 Flex with ER 3x10            clamshell Red 30x bilat side lying Red 30x bilat sidelying  Red 30x bilat sidelying    Hip ext in prone        HOIST  Knee ext  Knee flex          Leg press S=6  Squat  HR     55# 2x10   55# 2x10  55# 2x10    Standing HR/TR Stand x30 ea Stand x30ea nv x30 ea    TKE        Standing hamstring curls        TB sidestepping/monster walk  RTB 2 laps ea * RTB 25ft x2 ea    Sit to stand   2x10 * 2x10                    Calf stretch        Hip flexor stretch        Hamstring stretch   Seated 3x :30 *stand :30x3    Ther Activity        Step ups   10x fwd, lat x10 fwd/lat    Sit to stand        Mini squats   * x10                    Gait Training                        Modalities                        José Antonio Payne was given a handout and verbal instruction of customized home exercise program  Patient accepted program and was able to demonstrate exercises  Access Code: [email or web address]  URL: [email or web address]  Date: 04/14/2021; updated 4/21/21, 4/30,6/2  Prepared by: Ger Gordon    Exercises  Seated Long Arc Quad with Hip Adduction - 1 x daily - 7 x weekly - 10 reps - 3 sets  Straight Leg Raise with External Rotation - 1 x daily - 7 x weekly - 10 reps - 3 sets  Seated Hamstring Stretch - 3 x daily - 7 x weekly - 1 sets - 3 reps - 30 sec hold  Supine clam shells   Stand HR/TR  Stand hamstring curls  hip flexor stretch  Quad set  Calf stretch  Sit to stand  Mini squats

## 2021-06-09 ENCOUNTER — APPOINTMENT (OUTPATIENT)
Dept: PHYSICAL THERAPY | Facility: CLINIC | Age: 61
End: 2021-06-09
Payer: COMMERCIAL

## 2021-06-09 ENCOUNTER — OFFICE VISIT (OUTPATIENT)
Dept: OBGYN CLINIC | Facility: CLINIC | Age: 61
End: 2021-06-09
Payer: COMMERCIAL

## 2021-06-09 VITALS
SYSTOLIC BLOOD PRESSURE: 104 MMHG | WEIGHT: 124 LBS | DIASTOLIC BLOOD PRESSURE: 63 MMHG | BODY MASS INDEX: 21.17 KG/M2 | HEIGHT: 64 IN | HEART RATE: 72 BPM

## 2021-06-09 DIAGNOSIS — M17.11 PRIMARY OSTEOARTHRITIS OF RIGHT KNEE: Primary | ICD-10-CM

## 2021-06-09 PROCEDURE — 3008F BODY MASS INDEX DOCD: CPT | Performed by: FAMILY MEDICINE

## 2021-06-09 PROCEDURE — 99214 OFFICE O/P EST MOD 30 MIN: CPT | Performed by: FAMILY MEDICINE

## 2021-06-09 PROCEDURE — 1036F TOBACCO NON-USER: CPT | Performed by: FAMILY MEDICINE

## 2021-06-09 NOTE — PROGRESS NOTES
[facility] [place]  [address]  [facility] [id number] [phone]      Chief Complaint:  Chief Complaint   Patient presents with    Right Knee - Pain, Swelling       Vitals:  /63 (BP Location: Right arm, Patient Position: Sitting, Cuff Size: Standard)   Pulse 72   Ht 5' 4" (1 626 m)   Wt 56 2 kg (124 lb)   BMI 21 28 kg/m²     The following portions of the patient's history were reviewed and updated as appropriate: allergies, current medications, past family history, past medical history, past social history, past surgical history, and problem list       Subjective:   Patient ID: Reggie Patricia is a 59 y o  male  Here c/o R knee pain and swelling  Started swelling again on Monday  Denies new injury  Hurts to walk/bend      Review of Systems   Constitutional: Negative for fatigue and fever  Respiratory: Negative for shortness of breath  Cardiovascular: Negative for chest pain  Gastrointestinal: Negative for abdominal pain and nausea  Genitourinary: Negative for dysuria  Musculoskeletal: Positive for arthralgias, gait problem and joint swelling  Skin: Negative for rash and wound  Neurological: Negative for weakness and headaches  Objective:  Right Knee Exam     Tenderness   The patient is experiencing tenderness in the lateral joint line  Range of Motion   Extension: normal   Flexion: normal     Other   Swelling: moderate  Effusion: effusion present          Observations     Right Knee   Positive for effusion  Physical Exam  Constitutional:       Appearance: Normal appearance  He is normal weight  Eyes:      Extraocular Movements: Extraocular movements intact  Neck:      Musculoskeletal: Normal range of motion  Pulmonary:      Effort: Pulmonary effort is normal    Musculoskeletal:         General: Swelling present  Right knee: He exhibits effusion     Skin:     General: Skin is warm and dry    Neurological:      General: No focal deficit present  Mental Status: He is alert and oriented to person, place, and time  Mental status is at baseline  Psychiatric:         Mood and Affect: Mood normal          Behavior: Behavior normal          Thought Content: Thought content normal          Judgment: Judgment normal                Assessment/Plan:  Assessment/Plan   Diagnoses and all orders for this visit:    Primary osteoarthritis of right knee  -     Injection procedure prior authorization; Future        Return for Recheck       Octavio Jimenez MD

## 2021-06-10 ENCOUNTER — OFFICE VISIT (OUTPATIENT)
Dept: PHYSICAL THERAPY | Facility: CLINIC | Age: 61
End: 2021-06-10
Payer: COMMERCIAL

## 2021-06-10 DIAGNOSIS — R26.2 DIFFICULTY WALKING: ICD-10-CM

## 2021-06-10 DIAGNOSIS — M25.561 ACUTE PAIN OF RIGHT KNEE: Primary | ICD-10-CM

## 2021-06-10 PROCEDURE — 97530 THERAPEUTIC ACTIVITIES: CPT | Performed by: PHYSICAL THERAPIST

## 2021-06-10 PROCEDURE — 97110 THERAPEUTIC EXERCISES: CPT | Performed by: PHYSICAL THERAPIST

## 2021-06-10 PROCEDURE — 97112 NEUROMUSCULAR REEDUCATION: CPT | Performed by: PHYSICAL THERAPIST

## 2021-06-10 NOTE — PROGRESS NOTES
Daily Note/Discharge     Today's date: 6/10/2021  Patient name: Lavern Shaffer  : 1960  MRN: [id number]  Referring provider: COLBY Elizalde  Dx:   Encounter Diagnosis     ICD-10-CM    1  Acute pain of right knee  M25 561    2  Difficulty walking  R26 2        Start Time: 1135  Stop Time: 1215  Total time in clinic (min): 40 minutes    Subjective: Patient states knee is more sore than usual today, no reason given  Objective: See treatment diary below  Patient arrived 20 min late    Assessment: Tolerated treatment well  Right knee range of motion and strength at baseline function  At end of session patient stated he would like to make today his last session  Has been given exercises for home         Plan: DC PT to home exercise program     Precautions: none noted    Manuals  6/10           stretching        Patellar mobs                Neuro Re-Ed        QS :05 x10  HEP     SLB        Tandem stance     2x:30   Fitter board   15x ea with stops 15x ea with stops 15x ea with stops   steamboat                        Ther Ex        Bike S=8 110/70 x10 min S8 110/70x10 True bike L1 10 min True bike L1 10 min True bike L1 10 min   SAQ With ball 30x 3# 3x10 prn     LAQ With ball 3x10 3x10 1# with ball 3x10 B/L 1# 3x10 w/ball 1# 3x10 w/ball R   bridging  RTB 2x10 nv RTB 2x10 RTB 2x10   Leg raises 3x10 w/ ER Flex/abd 3x10 ea Flex with ER 3x10 Flex with ER 3x10            clamshell Red 30x bilat side lying Red 30x bilat sidelying  Red 30x bilat sidelying    Hip ext in prone        HOIST  Knee ext  Knee flex          Leg press S=6  Squat  HR     55# 2x10   55# 2x10  55# 2x10   55# 3x10     Standing HR/TR Stand x30 ea Stand x30ea nv x30 ea    TKE        Standing hamstring curls        TB sidestepping/monster walk  RTB 2 laps ea * RTB 25ft x2 ea RTB 25ft x4 ea   Sit to stand   2x10 * 2x10                    Calf stretch        Hip flexor stretch        Hamstring stretch   Seated 3x :30 *stand :30x3    Ther Activity        Step ups   10x fwd, lat x10 fwd/lat x10 fwd/lat   Sit to stand        Mini squats   * x10 10x                   Gait Training                        Modalities                        José Antonio Payne was given a handout and verbal instruction of customized home exercise program  Patient accepted program and was able to demonstrate exercises  Access Code: [email or web address]  URL: [email or web address]  Date: 04/14/2021; updated 4/21/21, 4/30,6/2  Prepared by: Huy Lucio    Exercises  Seated Long Arc Quad with Hip Adduction - 1 x daily - 7 x weekly - 10 reps - 3 sets  Straight Leg Raise with External Rotation - 1 x daily - 7 x weekly - 10 reps - 3 sets  Seated Hamstring Stretch - 3 x daily - 7 x weekly - 1 sets - 3 reps - 30 sec hold  Supine clam shells   Stand HR/TR  Stand hamstring curls  hip flexor stretch  Quad set  Calf stretch  Sit to stand  Mini squats

## 2021-07-21 ENCOUNTER — PROCEDURE VISIT (OUTPATIENT)
Dept: OBGYN CLINIC | Facility: CLINIC | Age: 61
End: 2021-07-21
Payer: COMMERCIAL

## 2021-07-21 VITALS
WEIGHT: 124.5 LBS | TEMPERATURE: 97.9 F | HEIGHT: 64 IN | BODY MASS INDEX: 21.25 KG/M2 | SYSTOLIC BLOOD PRESSURE: 116 MMHG | HEART RATE: 88 BPM | DIASTOLIC BLOOD PRESSURE: 72 MMHG

## 2021-07-21 DIAGNOSIS — M17.11 PRIMARY OSTEOARTHRITIS OF RIGHT KNEE: Primary | ICD-10-CM

## 2021-07-21 PROCEDURE — 99213 OFFICE O/P EST LOW 20 MIN: CPT | Performed by: FAMILY MEDICINE

## 2021-07-21 PROCEDURE — 3008F BODY MASS INDEX DOCD: CPT | Performed by: FAMILY MEDICINE

## 2021-07-21 PROCEDURE — 1036F TOBACCO NON-USER: CPT | Performed by: FAMILY MEDICINE

## 2021-07-21 NOTE — PATIENT INSTRUCTIONS
F/u as needed  Patient will consider having visco injection- he is not sure about it at this time  Icing/heat/OTC pain meds as needed

## 2021-07-21 NOTE — PROGRESS NOTES
[facility] [address] [place] [address]  [address]  [address] [facility] [address] [phone]      Chief Complaint:  Chief Complaint   Patient presents with    Right Knee - Follow-up       Vitals:  /72   Pulse 88   Temp 97 9 °F (36 6 °C)   Ht 5' 4" (1 626 m)   Wt 56 5 kg (124 lb 8 oz)   BMI 21 37 kg/m²     The following portions of the patient's history were reviewed and updated as appropriate: allergies, current medications, past family history, past medical history, past social history, past surgical history, and problem list       Subjective:   Patient ID: Matilda Carranza is a 59 y o  male  Here for R knee visco injection        Review of Systems   Constitutional: Negative for fatigue and fever  Respiratory: Positive for cough  Negative for shortness of breath  Cardiovascular: Negative for chest pain  Gastrointestinal: Negative for abdominal pain and nausea  Genitourinary: Negative for dysuria  Musculoskeletal: Positive for arthralgias  Skin: Negative for rash and wound  Neurological: Negative for weakness and headaches  Objective:  Right Knee Exam     Tenderness   The patient is experiencing tenderness in the medial joint line  Range of Motion   The patient has normal right knee ROM  Other   Swelling: mild  Effusion: effusion present          Observations     Right Knee   Positive for effusion  Physical Exam  Constitutional:       Appearance: Normal appearance  He is normal weight  Eyes:      Extraocular Movements: Extraocular movements intact  Pulmonary:      Effort: Pulmonary effort is normal    Musculoskeletal:         General: Tenderness present  Cervical back: Normal range of motion  Right knee: Effusion present  Skin:     General: Skin is warm and dry  Neurological:      General: No focal deficit present  Mental Status: He is alert and oriented to person, place, and time  Mental status is at baseline  Psychiatric:         Mood and Affect: Mood normal          Behavior: Behavior normal          Thought Content: Thought content normal          Judgment: Judgment normal                Assessment/Plan:  Assessment/Plan   Diagnoses and all orders for this visit:    Primary osteoarthritis of right knee        Return if symptoms worsen or fail to improve       Alyssa Santiago MD

## 2021-08-11 ENCOUNTER — PROCEDURE VISIT (OUTPATIENT)
Dept: OBGYN CLINIC | Facility: CLINIC | Age: 61
End: 2021-08-11
Payer: COMMERCIAL

## 2021-08-11 VITALS
WEIGHT: 124 LBS | DIASTOLIC BLOOD PRESSURE: 76 MMHG | HEART RATE: 81 BPM | SYSTOLIC BLOOD PRESSURE: 113 MMHG | BODY MASS INDEX: 21.17 KG/M2 | HEIGHT: 64 IN | TEMPERATURE: 98 F

## 2021-08-11 DIAGNOSIS — Z12.11 ENCOUNTER FOR SCREENING COLONOSCOPY: Primary | ICD-10-CM

## 2021-08-11 DIAGNOSIS — M17.11 PRIMARY OSTEOARTHRITIS OF RIGHT KNEE: ICD-10-CM

## 2021-08-11 PROCEDURE — 99214 OFFICE O/P EST MOD 30 MIN: CPT | Performed by: FAMILY MEDICINE

## 2021-08-11 PROCEDURE — 20610 DRAIN/INJ JOINT/BURSA W/O US: CPT | Performed by: FAMILY MEDICINE

## 2021-08-11 RX ORDER — LIDOCAINE HYDROCHLORIDE 10 MG/ML
4 INJECTION, SOLUTION INFILTRATION; PERINEURAL
Status: COMPLETED | OUTPATIENT
Start: 2021-08-11 | End: 2021-08-11

## 2021-08-11 RX ADMIN — LIDOCAINE HYDROCHLORIDE 4 ML: 10 INJECTION, SOLUTION INFILTRATION; PERINEURAL at 11:07

## 2021-08-11 NOTE — PROGRESS NOTES
[facility] [address] [facility]  [address]  [address] [place] [address] [phone]      Chief Complaint:  Chief Complaint   Patient presents with    Right Knee - Follow-up       Vitals:  /76 (BP Location: Left arm, Patient Position: Sitting, Cuff Size: Standard)   Pulse 81   Temp 98 °F (36 7 °C)   Ht 5' 4" (1 626 m)   Wt 56 2 kg (124 lb)   BMI 21 28 kg/m²     The following portions of the patient's history were reviewed and updated as appropriate: allergies, current medications, past family history, past medical history, past social history, past surgical history, and problem list       Subjective:   Patient ID: Genia Renee is a 59 y o  male  Here for Visco injection R knee        Review of Systems   Constitutional: Negative for fatigue and fever  Respiratory: Negative for shortness of breath  Cardiovascular: Negative for chest pain  Gastrointestinal: Negative for abdominal pain and nausea  Genitourinary: Negative for dysuria  Musculoskeletal: Positive for arthralgias  Skin: Negative for rash and wound  Neurological: Negative for weakness and headaches  Objective:  Right Knee Exam     Tenderness   The patient is experiencing tenderness in the medial joint line  Other   Swelling: mild  Effusion: effusion present          Observations     Right Knee   Positive for effusion  Physical Exam  Constitutional:       Appearance: Normal appearance  He is normal weight  Eyes:      Extraocular Movements: Extraocular movements intact  Pulmonary:      Effort: Pulmonary effort is normal    Musculoskeletal:         General: Swelling and tenderness present  Cervical back: Normal range of motion  Right knee: Effusion present  Skin:     General: Skin is warm and dry  Neurological:      General: No focal deficit present  Mental Status: He is alert and oriented to person, place, and time  Mental status is at baseline  Psychiatric:         Mood and Affect: Mood normal          Behavior: Behavior normal          Thought Content: Thought content normal          Judgment: Judgment normal          Large joint arthrocentesis: R knee  Universal Protocol:  Risks and benefits: risks, benefits and alternatives were discussed  Consent given by: patient  Time out: Immediately prior to procedure a "time out" was called to verify the correct patient, procedure, equipment, support staff and site/side marked as required  Timeout called at: 8/11/2021 10:59 AM   Site marked: the operative site was marked  Supporting Documentation  Indications: pain   Procedure Details  Location: knee - R knee  Preparation: Patient was prepped and draped in the usual sterile fashion  Needle gauge: 21   Ultrasound guidance: no  Approach: anterolateral  Medications administered: 4 mL lidocaine 1 %; 3 mL sodium hyaluronate 60 MG/3ML    Aspirate amount: 10 mL  Aspirate: clear and yellow    Patient tolerance: patient tolerated the procedure well with no immediate complications  Dressing:  Sterile dressing applied            Assessment/Plan:  Assessment/Plan   Diagnoses and all orders for this visit:    Encounter for screening colonoscopy  -     Ambulatory referral for colon cancer education; Future    Primary osteoarthritis of right knee    Other orders  -     Large joint arthrocentesis        Return if symptoms worsen or fail to improve       Krissy Hudson MD

## 2021-10-22 ENCOUNTER — OFFICE VISIT (OUTPATIENT)
Dept: INTERNAL MEDICINE CLINIC | Facility: CLINIC | Age: 61
End: 2021-10-22
Payer: COMMERCIAL

## 2021-10-22 VITALS
RESPIRATION RATE: 14 BRPM | HEIGHT: 64 IN | TEMPERATURE: 99.8 F | DIASTOLIC BLOOD PRESSURE: 68 MMHG | BODY MASS INDEX: 20.73 KG/M2 | OXYGEN SATURATION: 99 % | HEART RATE: 95 BPM | SYSTOLIC BLOOD PRESSURE: 104 MMHG | WEIGHT: 121.4 LBS

## 2021-10-22 DIAGNOSIS — M17.11 PRIMARY OSTEOARTHRITIS OF RIGHT KNEE: ICD-10-CM

## 2021-10-22 DIAGNOSIS — Z13.0 SCREENING, ANEMIA, DEFICIENCY, IRON: ICD-10-CM

## 2021-10-22 DIAGNOSIS — Z23 ENCOUNTER FOR ADMINISTRATION OF VACCINE: Primary | ICD-10-CM

## 2021-10-22 DIAGNOSIS — Z13.1 SCREENING FOR DIABETES MELLITUS: ICD-10-CM

## 2021-10-22 DIAGNOSIS — Z13.220 SCREENING FOR HYPERCHOLESTEROLEMIA: ICD-10-CM

## 2021-10-22 DIAGNOSIS — Z12.5 SCREENING PSA (PROSTATE SPECIFIC ANTIGEN): ICD-10-CM

## 2021-10-22 PROCEDURE — 90471 IMMUNIZATION ADMIN: CPT | Performed by: INTERNAL MEDICINE

## 2021-10-22 PROCEDURE — 99213 OFFICE O/P EST LOW 20 MIN: CPT | Performed by: INTERNAL MEDICINE

## 2021-10-22 PROCEDURE — 90682 RIV4 VACC RECOMBINANT DNA IM: CPT | Performed by: INTERNAL MEDICINE

## 2021-11-04 ENCOUNTER — APPOINTMENT (OUTPATIENT)
Dept: LAB | Facility: CLINIC | Age: 61
End: 2021-11-04
Payer: COMMERCIAL

## 2021-11-04 DIAGNOSIS — Z12.5 SCREENING PSA (PROSTATE SPECIFIC ANTIGEN): ICD-10-CM

## 2021-11-04 DIAGNOSIS — Z13.0 SCREENING, ANEMIA, DEFICIENCY, IRON: ICD-10-CM

## 2021-11-04 DIAGNOSIS — Z13.220 SCREENING FOR HYPERCHOLESTEROLEMIA: ICD-10-CM

## 2021-11-04 DIAGNOSIS — Z13.1 SCREENING FOR DIABETES MELLITUS: ICD-10-CM

## 2021-11-04 LAB
ALBUMIN SERPL BCP-MCNC: 4.2 G/DL (ref 3.5–5)
ALP SERPL-CCNC: 68 U/L (ref 46–116)
ALT SERPL W P-5'-P-CCNC: 40 U/L (ref 12–78)
ANION GAP SERPL CALCULATED.3IONS-SCNC: 1 MMOL/L (ref 4–13)
AST SERPL W P-5'-P-CCNC: 37 U/L (ref 5–45)
BASOPHILS # BLD AUTO: 0.03 THOUSANDS/ΜL (ref 0–0.1)
BASOPHILS NFR BLD AUTO: 1 % (ref 0–1)
BILIRUB SERPL-MCNC: 0.49 MG/DL (ref 0.2–1)
BUN SERPL-MCNC: 18 MG/DL (ref 5–25)
CALCIUM SERPL-MCNC: 9.4 MG/DL (ref 8.3–10.1)
CHLORIDE SERPL-SCNC: 109 MMOL/L (ref 100–108)
CHOLEST SERPL-MCNC: 215 MG/DL (ref 50–200)
CO2 SERPL-SCNC: 28 MMOL/L (ref 21–32)
CREAT SERPL-MCNC: 0.94 MG/DL (ref 0.6–1.3)
EOSINOPHIL # BLD AUTO: 0.04 THOUSAND/ΜL (ref 0–0.61)
EOSINOPHIL NFR BLD AUTO: 1 % (ref 0–6)
ERYTHROCYTE [DISTWIDTH] IN BLOOD BY AUTOMATED COUNT: 13.6 % (ref 11.6–15.1)
GFR SERPL CREATININE-BSD FRML MDRD: 86 ML/MIN/1.73SQ M
GLUCOSE P FAST SERPL-MCNC: 102 MG/DL (ref 65–99)
HCT VFR BLD AUTO: 44.9 % (ref 36.5–49.3)
HCV AB SER QL: NORMAL
HDLC SERPL-MCNC: 75 MG/DL
HGB BLD-MCNC: 15 G/DL (ref 12–17)
IMM GRANULOCYTES # BLD AUTO: 0.02 THOUSAND/UL (ref 0–0.2)
IMM GRANULOCYTES NFR BLD AUTO: 0 % (ref 0–2)
LDLC SERPL CALC-MCNC: 129 MG/DL (ref 0–100)
LYMPHOCYTES # BLD AUTO: 1.28 THOUSANDS/ΜL (ref 0.6–4.47)
LYMPHOCYTES NFR BLD AUTO: 24 % (ref 14–44)
MCH RBC QN AUTO: 29.5 PG (ref 26.8–34.3)
MCHC RBC AUTO-ENTMCNC: 33.4 G/DL (ref 31.4–37.4)
MCV RBC AUTO: 88 FL (ref 82–98)
MONOCYTES # BLD AUTO: 0.46 THOUSAND/ΜL (ref 0.17–1.22)
MONOCYTES NFR BLD AUTO: 9 % (ref 4–12)
NEUTROPHILS # BLD AUTO: 3.43 THOUSANDS/ΜL (ref 1.85–7.62)
NEUTS SEG NFR BLD AUTO: 65 % (ref 43–75)
NRBC BLD AUTO-RTO: 0 /100 WBCS
PLATELET # BLD AUTO: 194 THOUSANDS/UL (ref 149–390)
PMV BLD AUTO: 10.9 FL (ref 8.9–12.7)
POTASSIUM SERPL-SCNC: 4.8 MMOL/L (ref 3.5–5.3)
PROT SERPL-MCNC: 7.9 G/DL (ref 6.4–8.2)
PSA SERPL-MCNC: 0.6 NG/ML (ref 0–4)
RBC # BLD AUTO: 5.09 MILLION/UL (ref 3.88–5.62)
SODIUM SERPL-SCNC: 138 MMOL/L (ref 136–145)
TRIGL SERPL-MCNC: 56 MG/DL
WBC # BLD AUTO: 5.26 THOUSAND/UL (ref 4.31–10.16)

## 2021-11-04 PROCEDURE — 86803 HEPATITIS C AB TEST: CPT | Performed by: INTERNAL MEDICINE

## 2021-11-04 PROCEDURE — 80053 COMPREHEN METABOLIC PANEL: CPT

## 2021-11-04 PROCEDURE — G0103 PSA SCREENING: HCPCS

## 2021-11-04 PROCEDURE — 85025 COMPLETE CBC W/AUTO DIFF WBC: CPT

## 2021-11-04 PROCEDURE — 80061 LIPID PANEL: CPT

## 2021-11-04 PROCEDURE — 36415 COLL VENOUS BLD VENIPUNCTURE: CPT | Performed by: INTERNAL MEDICINE

## 2021-12-10 ENCOUNTER — OFFICE VISIT (OUTPATIENT)
Dept: INTERNAL MEDICINE CLINIC | Facility: CLINIC | Age: 61
End: 2021-12-10
Payer: COMMERCIAL

## 2021-12-10 VITALS
OXYGEN SATURATION: 99 % | WEIGHT: 123.4 LBS | RESPIRATION RATE: 14 BRPM | SYSTOLIC BLOOD PRESSURE: 100 MMHG | HEIGHT: 64 IN | BODY MASS INDEX: 21.07 KG/M2 | HEART RATE: 82 BPM | TEMPERATURE: 98.2 F | DIASTOLIC BLOOD PRESSURE: 60 MMHG

## 2021-12-10 DIAGNOSIS — N20.0 NEPHROLITHIASIS: ICD-10-CM

## 2021-12-10 DIAGNOSIS — L72.3 SEBACEOUS CYST: Primary | ICD-10-CM

## 2021-12-10 PROCEDURE — 99214 OFFICE O/P EST MOD 30 MIN: CPT | Performed by: INTERNAL MEDICINE

## 2021-12-10 RX ORDER — AMOXICILLIN AND CLAVULANATE POTASSIUM 875; 125 MG/1; MG/1
1 TABLET, FILM COATED ORAL EVERY 12 HOURS SCHEDULED
Qty: 14 TABLET | Refills: 0 | Status: SHIPPED | OUTPATIENT
Start: 2021-12-10 | End: 2021-12-17

## 2021-12-24 ENCOUNTER — HOSPITAL ENCOUNTER (OUTPATIENT)
Dept: CT IMAGING | Facility: HOSPITAL | Age: 61
Discharge: HOME | End: 2021-12-24
Payer: COMMERCIAL

## 2021-12-24 DIAGNOSIS — N20.0 NEPHROLITHIASIS: ICD-10-CM

## 2021-12-24 PROCEDURE — 74176 CT ABD & PELVIS W/O CONTRAST: CPT

## 2021-12-24 PROCEDURE — G1004 CDSM NDSC: HCPCS

## 2022-01-14 ENCOUNTER — APPOINTMENT (OUTPATIENT)
Dept: RADIOLOGY | Facility: CLINIC | Age: 62
End: 2022-01-14
Payer: COMMERCIAL

## 2022-01-14 ENCOUNTER — OFFICE VISIT (OUTPATIENT)
Dept: URGENT CARE | Facility: CLINIC | Age: 62
End: 2022-01-14
Payer: COMMERCIAL

## 2022-01-14 VITALS
SYSTOLIC BLOOD PRESSURE: 119 MMHG | TEMPERATURE: 97.6 F | DIASTOLIC BLOOD PRESSURE: 58 MMHG | HEIGHT: 64 IN | HEART RATE: 85 BPM | BODY MASS INDEX: 22.2 KG/M2 | WEIGHT: 130 LBS | RESPIRATION RATE: 16 BRPM | OXYGEN SATURATION: 99 %

## 2022-01-14 DIAGNOSIS — S69.91XA INJURY OF FINGER OF RIGHT HAND, INITIAL ENCOUNTER: ICD-10-CM

## 2022-01-14 DIAGNOSIS — S61.210A LACERATION OF RIGHT INDEX FINGER WITHOUT FOREIGN BODY WITHOUT DAMAGE TO NAIL, INITIAL ENCOUNTER: Primary | ICD-10-CM

## 2022-01-14 PROCEDURE — 90715 TDAP VACCINE 7 YRS/> IM: CPT

## 2022-01-14 PROCEDURE — 12001 RPR S/N/AX/GEN/TRNK 2.5CM/<: CPT | Performed by: NURSE PRACTITIONER

## 2022-01-14 PROCEDURE — 73140 X-RAY EXAM OF FINGER(S): CPT

## 2022-01-14 PROCEDURE — 90715 TDAP VACCINE 7 YRS/> IM: CPT | Performed by: NURSE PRACTITIONER

## 2022-01-14 PROCEDURE — 99213 OFFICE O/P EST LOW 20 MIN: CPT | Performed by: NURSE PRACTITIONER

## 2022-01-14 NOTE — PATIENT INSTRUCTIONS
Keep covered an apply bacitracin twice daily for the first few days  Monitor for redness, swelling, drainage, fever, numbness or tingling  Please return or proceed to ER with any new or concerning symptoms  Please return in 7-10 days for suture removal         Laceration   WHAT YOU NEED TO KNOW:   A laceration is an injury to the skin and the soft tissue underneath it  Lacerations can happen anywhere on the body  DISCHARGE INSTRUCTIONS:   Return to the emergency department if:   · You have heavy bleeding or bleeding that does not stop after 10 minutes of holding firm, direct pressure over the wound  · Your wound opens up  Call your doctor if:   · You have a fever or chills  · Your laceration is red, warm, or swollen  · You have red streaks on your skin coming from your wound  · You have white or yellow drainage from the wound that smells bad  · You have pain that gets worse, even after treatment  · You have questions or concerns about your condition or care  Medicines: You may need any of the following:  · Prescription pain medicine  may be given  Ask your healthcare provider how to take this medicine safely  Some prescription pain medicines contain acetaminophen  Do not take other medicines that contain acetaminophen without talking to your healthcare provider  Too much acetaminophen may cause liver damage  Prescription pain medicine may cause constipation  Ask your healthcare provider how to prevent or treat constipation  · Antibiotics  help treat or prevent a bacterial infection  · Take your medicine as directed  Contact your healthcare provider if you think your medicine is not helping or if you have side effects  Tell him or her if you are allergic to any medicine  Keep a list of the medicines, vitamins, and herbs you take  Include the amounts, and when and why you take them  Bring the list or the pill bottles to follow-up visits   Carry your medicine list with you in case of an emergency  Care for your wound as directed:   · Do not get your wound wet  until your healthcare provider says it is okay  Do not soak your wound in water  Do not go swimming until your healthcare provider says it is okay  Carefully wash the wound with soap and water  Gently pat the area dry or allow it to air dry  · Change your bandages  when they get wet, dirty, or after washing  Apply new, clean bandages as directed  Do not apply elastic bandages or tape too tight  Do not put powders or lotions over your incision  · Apply antibiotic ointment as directed  Your healthcare provider may give you antibiotic ointment to put over your wound if you have stitches  If you have strips of tape over your incision, let them dry up and fall off on their own  If they do not fall off within 14 days, gently remove them  If you have glue over your wound, do not remove or pick at it  If your glue comes off, do not replace it with glue that you have at home  · Check your wound every day for signs of infection, such as swelling, redness, or pus  Self-care:   · Apply ice  on your wound for 15 to 20 minutes every hour or as directed  Use an ice pack, or put crushed ice in a plastic bag  Cover it with a towel  Ice helps prevent tissue damage and decreases swelling and pain  · Use a splint as directed  A splint will decrease movement and stress on your wound  It may help it heal faster  A splint may be used for lacerations over joints or areas of your body that bend  Ask your healthcare provider how to apply and remove a splint  · Decrease scarring of your wound  by applying ointments as directed  Do not apply ointments until your healthcare provider says it is okay  You may need to wait until your wound is healed  Ask which ointment to buy and how often to use it  After your wound is healed, use sunscreen over the area when you are out in the sun   You should do this for at least 6 months to 1 year after your injury  Follow up with your doctor as directed: You may need to follow up in 24 to 48 hours to have your wound checked for infection  You will need to return in 3 to 14 days if you have stitches or staples so they can be removed  Care for your wound as directed to prevent infection and help it heal  Write down your questions so you remember to ask them during your visits  © Copyright [employer] 2021 Information is for End User's use only and may not be sold, redistributed or otherwise used for commercial purposes  All illustrations and images included in CareNotes® are the copyrighted property of A D A M , Inc  or [facility] Kalia Lawton   The above information is an  only  It is not intended as medical advice for individual conditions or treatments  Talk to your doctor, nurse or pharmacist before following any medical regimen to see if it is safe and effective for you

## 2022-01-14 NOTE — PROGRESS NOTES
[facility]        NAME: Fernando Mccloud is a 57 y o  male  : 1960    MRN: [id number]  DATE: 2022  TIME: 3:54 PM    Assessment and Plan   Laceration of right index finger without foreign body without damage to nail, initial encounter [S61 210A]  1  Laceration of right index finger without foreign body without damage to nail, initial encounter  Tdap Vaccine greater than or equal to 8yo   2  Injury of finger of right hand, initial encounter  XR finger right second digit-index       Laceration repair    Date/Time: 2022 3:55 PM  Performed by: COLBY Espinosa  Authorized by: COLBY Espinosa   Consent: Verbal consent obtained  Risks and benefits: risks, benefits and alternatives were discussed  Consent given by: patient  Patient understanding: patient states understanding of the procedure being performed  Required items: required blood products, implants, devices, and special equipment available  Patient identity confirmed: verbally with patient  Time out: Immediately prior to procedure a "time out" was called to verify the correct patient, procedure, equipment, support staff and site/side marked as required  Body area: upper extremity  Location details: right index finger  Laceration length: 1 5 cm  Foreign bodies: no foreign bodies  Tendon involvement: none  Nerve involvement: none  Vascular damage: no  Anesthesia: local infiltration    Anesthesia:  Local Anesthetic: lidocaine 1% without epinephrine    Wound Dehiscence:  Superficial Wound Dehiscence: simple closure      Procedure Details:  Preparation: Patient was prepped and draped in the usual sterile fashion    Irrigation solution: saline  Irrigation method: syringe  Amount of cleaning: standard  Skin closure: 5-0 nylon  Number of sutures: 5  Technique: simple  Approximation: close  Approximation difficulty: simple  Dressing: antibiotic ointment, gauze roll and non-adhesive packing strip  Patient tolerance: patient tolerated the procedure well with no immediate complications            Patient Instructions       Follow up with PCP in 3-5 days  Proceed to  ER if symptoms worsen  Chief Complaint     Chief Complaint   Patient presents with    Finger Injury     pinched his index finger in the car door  History of Present Illness       Patient is a 58-year-old male who presents with a laceration to his right index finger  Patient states that just prior to arrival patient accidentally shut the car door on his right index finger  Complaining of pain and bleeding  Bleeding currently controlled with pressure  Patient denies any numbness, tingling, weakness of extremity  Patient is right-hand dominant  Patient unsure the date of his last tetanus shot  Denies feeling dizzy or lightheaded      Review of Systems   Review of Systems   Constitutional: Negative for chills, diaphoresis, fatigue and fever  Respiratory: Negative  Cardiovascular: Negative  Musculoskeletal: Positive for arthralgias  Negative for back pain, gait problem, joint swelling, myalgias, neck pain and neck stiffness  Skin: Positive for wound  Negative for rash  Neurological: Negative for weakness and numbness           Current Medications       Current Outpatient Medications:     celecoxib (CeleBREX) 200 mg capsule, Take 1 capsule (200 mg total) by mouth daily as needed for mild pain (right knee pain) (Patient not taking: Reported on 8/11/2021), Disp: 30 capsule, Rfl: 0    levocetirizine (XYZAL) 5 MG tablet, Take 1 tablet (5 mg total) by mouth every evening (Patient not taking: Reported on 12/10/2021 ), Disp: 30 tablet, Rfl: 2    Current Allergies     Allergies as of 01/14/2022    (No Known Allergies)            The following portions of the patient's history were reviewed and updated as appropriate: allergies, current medications, past family history, past medical history, past social history, past surgical history and problem list      Past Medical History:   Diagnosis Date    Kidney stone        Past Surgical History:   Procedure Laterality Date    KIDNEY STONE SURGERY         Family History   Problem Relation Age of Onset    No Known Problems Mother     No Known Problems Father          Medications have been verified  Objective   /58   Pulse 85   Temp 97 6 °F (36 4 °C)   Resp 16   Ht 5' 4" (1 626 m)   Wt 59 kg (130 lb)   SpO2 99%   BMI 22 31 kg/m²   No LMP for male patient  Physical Exam     Physical Exam  Constitutional:       General: He is not in acute distress  Appearance: Normal appearance  He is not diaphoretic  HENT:      Head: Normocephalic and atraumatic  Cardiovascular:      Rate and Rhythm: Normal rate and regular rhythm  Pulses:           Radial pulses are 2+ on the right side and 2+ on the left side  Heart sounds: Normal heart sounds, S1 normal and S2 normal    Pulmonary:      Effort: Pulmonary effort is normal       Breath sounds: Normal breath sounds and air entry  Skin:     General: Skin is warm and dry  Capillary Refill: Capillary refill takes less than 2 seconds  Findings: Laceration present  Erythema: 1 5cm laceration to distal, palmar aspect of right index finger  well approximated  bleeding controlled with pressure  NV Intact  TTP  full ROM  Neurological:      Mental Status: He is alert

## 2022-01-14 NOTE — LETTER
January 14, 2022     Patient: Luciano Justin   YOB: 1960   Date of Visit: 1/14/2022       To Whom it May Concern:    José Antonio Vitaljeannineyvrose was seen in my clinic on 1/14/2022  He may return to work on 1/16/2022  If you have any questions or concerns, please don't hesitate to call           Sincerely,          COLBY Corea        CC: No Recipients

## 2022-01-21 ENCOUNTER — OFFICE VISIT (OUTPATIENT)
Dept: URGENT CARE | Facility: CLINIC | Age: 62
End: 2022-01-21
Payer: COMMERCIAL

## 2022-01-21 VITALS — RESPIRATION RATE: 18 BRPM | SYSTOLIC BLOOD PRESSURE: 114 MMHG | HEART RATE: 74 BPM | DIASTOLIC BLOOD PRESSURE: 66 MMHG

## 2022-01-21 DIAGNOSIS — Z48.02 ENCOUNTER FOR REMOVAL OF SUTURES: Primary | ICD-10-CM

## 2022-01-21 PROCEDURE — 99212 OFFICE O/P EST SF 10 MIN: CPT | Performed by: PHYSICIAN ASSISTANT

## 2022-01-21 NOTE — PROGRESS NOTES
[facility]        NAME: Ronny Potter is a 57 y o  male  : 1960    MRN: [id number]  DATE: 2022  TIME: 11:30 AM    Assessment and Plan   Encounter for removal of sutures [Z48 02]  1  Encounter for removal of sutures           Patient Instructions       Follow up with PCP in 3-5 days  Proceed to  ER if symptoms worsen  Chief Complaint     Chief Complaint   Patient presents with    Suture / Staple Removal         History of Present Illness       Patient presents for suture removal of right index finger  Sutures were placed here 1 week ago  Has minimal discomfort  No drainage, redness, fever or chills  Review of Systems   Review of Systems   Constitutional: Negative for chills and fever  Skin: Positive for wound  Current Medications       Current Outpatient Medications:     celecoxib (CeleBREX) 200 mg capsule, Take 1 capsule (200 mg total) by mouth daily as needed for mild pain (right knee pain) (Patient not taking: Reported on 2021), Disp: 30 capsule, Rfl: 0    levocetirizine (XYZAL) 5 MG tablet, Take 1 tablet (5 mg total) by mouth every evening (Patient not taking: Reported on 12/10/2021 ), Disp: 30 tablet, Rfl: 2    Current Allergies     Allergies as of 2022    (No Known Allergies)            The following portions of the patient's history were reviewed and updated as appropriate: allergies, current medications, past family history, past medical history, past social history, past surgical history and problem list      Past Medical History:   Diagnosis Date    Kidney stone        Past Surgical History:   Procedure Laterality Date    KIDNEY STONE SURGERY         Family History   Problem Relation Age of Onset    No Known Problems Mother     No Known Problems Father          Medications have been verified  Objective   /66   Pulse 74   Resp 18   No LMP for male patient           Physical Exam     Physical Exam  Vitals and nursing note reviewed  Constitutional:       Appearance: Normal appearance  He is normal weight  HENT:      Head: Normocephalic and atraumatic  Cardiovascular:      Rate and Rhythm: Normal rate and regular rhythm  Pulmonary:      Effort: Pulmonary effort is normal    Musculoskeletal:      Comments: Well healed right index finger laceration  No redness or drainage  Capillary refill <2 sec  FROM of finger  Skin:     General: Skin is warm  Neurological:      Mental Status: He is alert  Suture removal    Date/Time: 1/21/2022 11:29 AM  Performed by: Ayaka Ling PA-C  Authorized by: Ayaka Ling PA-C   Universal Protocol:  Consent: Verbal consent obtained  Risks and benefits: risks, benefits and alternatives were discussed  Consent given by: patient  Patient understanding: patient states understanding of the procedure being performed  Patient identity confirmed: verbally with patient        Patient location: care now  Location:     Laterality:  Right    Location:  Upper extremity    Upper extremity location:  Hand    Hand location:  R index finger  Procedure details: Tools used:  Suture removal kit and scalpel    Wound appearance:  No sign(s) of infection, good wound healing and clean    Number of sutures removed:  5  Post-procedure details:     Post-removal:  Band-Aid applied    Patient tolerance of procedure:   Tolerated well, no immediate complications

## 2022-02-11 ENCOUNTER — OFFICE VISIT (OUTPATIENT)
Dept: URGENT CARE | Facility: CLINIC | Age: 62
End: 2022-02-11
Payer: COMMERCIAL

## 2022-02-11 VITALS
HEART RATE: 62 BPM | TEMPERATURE: 97.4 F | RESPIRATION RATE: 18 BRPM | DIASTOLIC BLOOD PRESSURE: 63 MMHG | OXYGEN SATURATION: 99 % | SYSTOLIC BLOOD PRESSURE: 119 MMHG

## 2022-02-11 DIAGNOSIS — H11.31 SUBCONJUNCTIVAL HEMORRHAGE OF RIGHT EYE: Primary | ICD-10-CM

## 2022-02-11 PROCEDURE — 99213 OFFICE O/P EST LOW 20 MIN: CPT | Performed by: NURSE PRACTITIONER

## 2022-02-11 NOTE — PROGRESS NOTES
[facility]        NAME: Katarina Bustillos is a 57 y o  male  : 1960    MRN: [id number]  DATE: 2022  TIME: 1:36 PM    Assessment and Plan   Subconjunctival hemorrhage of right eye [H11 31]  1  Subconjunctival hemorrhage of right eye           Patient Instructions     Patient Instructions       Recommend otc artifical tears and cool compresses  If you develop any increased redness, pain, vision changes, or any new or concerning symptoms please call EYE doctor or proceed to ER  Subconjunctival Hemorrhage   WHAT YOU NEED TO KNOW:   A subconjunctival hemorrhage is when blood collects under the conjunctiva in your eye  The conjunctiva is the clear lining that covers the white part of your eye  The blood comes from broken blood vessels under the conjunctiva  DISCHARGE INSTRUCTIONS:   Care for your eye:   · Cold or warm compress:  Use a cold pack during the first 24 hours  Ask how often to apply it and for how long each time  After the first 24 hours, apply a warm pack on your eye  Do this 3 times each day for about 10 to 15 minutes each time  · Eyedrops: You may need artificial tears to keep your eye moist  Use the drops as directed  Follow up with your healthcare provider or eye specialist as directed:  Write down your questions so you remember to ask them during your visits  Contact your healthcare provider or eye specialist if:   · The redness in your eye has not gone away after 3 weeks  · You have another subconjunctival hemorrhage  · You have subconjunctival hemorrhages in both eyes  · You have questions or concerns about your condition or care  Return to the emergency department if:   · You have eye pain or sensitivity to light  · Your vision changes  · You have white or yellow discharge from your eye      © Copyright [employer]  Information is for End User's use only and may not be sold, redistributed or otherwise used for commercial purposes  All illustrations and images included in CareNotes® are the copyrighted property of A D A M , Inc  or Pradip Lawton   The above information is an  only  It is not intended as medical advice for individual conditions or treatments  Talk to your doctor, nurse or pharmacist before following any medical regimen to see if it is safe and effective for you  Follow up with PCP in 3-5 days  Proceed to  ER if symptoms worsen  Chief Complaint     Chief Complaint   Patient presents with    Conjunctivitis     x2 days, right eye         History of Present Illness       Conjunctivitis   The current episode started 2 days ago  The onset was sudden  The problem has been unchanged  The problem is mild  Nothing relieves the symptoms  Nothing aggravates the symptoms  Associated symptoms include eye redness  Pertinent negatives include no fever, no decreased vision, no double vision, no eye itching, no photophobia, no headaches, no eye discharge and no eye pain  C/o 2 day hx of right eye redness  Denies any injury or trauma  Denies pain or foreign body sensation  Denies htn or straining  Denies contact use  Review of Systems   Review of Systems   Constitutional: Negative for chills, diaphoresis, fatigue and fever  HENT: Negative  Eyes: Positive for redness  Negative for double vision, photophobia, pain, discharge, itching and visual disturbance  Respiratory: Negative  Cardiovascular: Negative  Neurological: Negative  Negative for dizziness, syncope, weakness, light-headedness, numbness and headaches           Current Medications       Current Outpatient Medications:     celecoxib (CeleBREX) 200 mg capsule, Take 1 capsule (200 mg total) by mouth daily as needed for mild pain (right knee pain) (Patient not taking: Reported on 8/11/2021), Disp: 30 capsule, Rfl: 0    levocetirizine (XYZAL) 5 MG tablet, Take 1 tablet (5 mg total) by mouth every evening (Patient not taking: Reported on 12/10/2021 ), Disp: 30 tablet, Rfl: 2    Current Allergies     Allergies as of 02/11/2022    (No Known Allergies)            The following portions of the patient's history were reviewed and updated as appropriate: allergies, current medications, past family history, past medical history, past social history, past surgical history and problem list      Past Medical History:   Diagnosis Date    Kidney stone        Past Surgical History:   Procedure Laterality Date    KIDNEY STONE SURGERY         Family History   Problem Relation Age of Onset    No Known Problems Mother     No Known Problems Father          Medications have been verified  Objective   /63   Pulse 62   Temp (!) 97 4 °F (36 3 °C)   Resp 18   SpO2 99%   No LMP for male patient  Physical Exam     Physical Exam  Constitutional:       General: He is not in acute distress  Appearance: Normal appearance  He is not diaphoretic  HENT:      Head: Normocephalic and atraumatic  Eyes:      General: Lids are normal  Lids are everted, no foreign bodies appreciated  Extraocular Movements: Extraocular movements intact  Conjunctiva/sclera:      Right eye: Hemorrhage present  Left eye: No hemorrhage  Pupils: Pupils are equal, round, and reactive to light  Cardiovascular:      Rate and Rhythm: Normal rate and regular rhythm  Heart sounds: Normal heart sounds, S1 normal and S2 normal    Pulmonary:      Effort: Pulmonary effort is normal       Breath sounds: Normal breath sounds and air entry  Neurological:      Mental Status: He is alert and oriented to person, place, and time

## 2022-02-11 NOTE — PATIENT INSTRUCTIONS
Recommend otc artifical tears and cool compresses  If you develop any increased redness, pain, vision changes, or any new or concerning symptoms please call EYE doctor or proceed to ER  Subconjunctival Hemorrhage   WHAT YOU NEED TO KNOW:   A subconjunctival hemorrhage is when blood collects under the conjunctiva in your eye  The conjunctiva is the clear lining that covers the white part of your eye  The blood comes from broken blood vessels under the conjunctiva  DISCHARGE INSTRUCTIONS:   Care for your eye:   · Cold or warm compress:  Use a cold pack during the first 24 hours  Ask how often to apply it and for how long each time  After the first 24 hours, apply a warm pack on your eye  Do this 3 times each day for about 10 to 15 minutes each time  · Eyedrops: You may need artificial tears to keep your eye moist  Use the drops as directed  Follow up with your healthcare provider or eye specialist as directed:  Write down your questions so you remember to ask them during your visits  Contact your healthcare provider or eye specialist if:   · The redness in your eye has not gone away after 3 weeks  · You have another subconjunctival hemorrhage  · You have subconjunctival hemorrhages in both eyes  · You have questions or concerns about your condition or care  Return to the emergency department if:   · You have eye pain or sensitivity to light  · Your vision changes  · You have white or yellow discharge from your eye  © Copyright [employer] 2021 Information is for End User's use only and may not be sold, redistributed or otherwise used for commercial purposes  All illustrations and images included in CareNotes® are the copyrighted property of [employer]  or Pradip Greene  The above information is an  only  It is not intended as medical advice for individual conditions or treatments   Talk to your doctor, nurse or pharmacist before following any medical regimen to see if it is safe and effective for you

## 2022-02-25 ENCOUNTER — OFFICE VISIT (OUTPATIENT)
Dept: INTERNAL MEDICINE CLINIC | Facility: CLINIC | Age: 62
End: 2022-02-25
Payer: COMMERCIAL

## 2022-02-25 VITALS
HEART RATE: 68 BPM | BODY MASS INDEX: 21.65 KG/M2 | SYSTOLIC BLOOD PRESSURE: 124 MMHG | HEIGHT: 64 IN | TEMPERATURE: 97.8 F | DIASTOLIC BLOOD PRESSURE: 56 MMHG | RESPIRATION RATE: 12 BRPM | WEIGHT: 126.8 LBS

## 2022-02-25 DIAGNOSIS — N60.81 SEBACEOUS CYST OF BREAST, RIGHT: Primary | ICD-10-CM

## 2022-02-25 PROCEDURE — 99212 OFFICE O/P EST SF 10 MIN: CPT | Performed by: INTERNAL MEDICINE

## 2022-02-25 NOTE — PROGRESS NOTES
Assessment/Plan:    Problem List Items Addressed This Visit     None      Visit Diagnoses     Sebaceous cyst of breast, right    -  Primary    Follow up for excession  Diagnoses and all orders for this visit:    Sebaceous cyst of breast, right  Comments: Follow up for excession  No problem-specific Assessment & Plan notes found for this encounter  Subjective:      Patient ID: Jaqueline Jurado is a 57 y o  male  The patient is concerned secondary to a mass on the anterior chest above the right nipple  This very concerning to him  The patient notes that the mass has gotten bigger  The following portions of the patient's history were reviewed and updated as appropriate:   He has a past medical history of Kidney stone  ,  does not have any pertinent problems on file  ,   has a past surgical history that includes Kidney stone surgery  ,  family history includes No Known Problems in his father and mother  ,   reports that he has never smoked  He has never used smokeless tobacco  He reports previous alcohol use  He reports that he does not use drugs  ,  has No Known Allergies     Current Outpatient Medications   Medication Sig Dispense Refill    levocetirizine (XYZAL) 5 MG tablet Take 1 tablet (5 mg total) by mouth every evening 30 tablet 2    celecoxib (CeleBREX) 200 mg capsule Take 1 capsule (200 mg total) by mouth daily as needed for mild pain (right knee pain) (Patient not taking: Reported on 8/11/2021) 30 capsule 0     No current facility-administered medications for this visit  Review of Systems   Constitutional: Negative for chills, fatigue and fever  HENT: Negative  Respiratory: Negative for cough, chest tightness and shortness of breath  Cardiovascular: Negative for chest pain and palpitations  Gastrointestinal: Negative for abdominal pain, constipation, diarrhea, nausea and vomiting  Genitourinary: Negative      Musculoskeletal: Negative for back pain and myalgias  Skin: Negative  Neurological: Negative  Psychiatric/Behavioral: Negative for dysphoric mood  The patient is not nervous/anxious  Objective:  Vitals:    02/25/22 1048   BP: 124/56   BP Location: Left arm   Patient Position: Sitting   Cuff Size: Child   Pulse: 68   Resp: 12   Temp: 97 8 °F (36 6 °C)   Weight: 57 5 kg (126 lb 12 8 oz)   Height: 5' 4" (1 626 m)     Body mass index is 21 77 kg/m²  Physical Exam  Vitals and nursing note reviewed  Constitutional:       Appearance: He is well-developed  HENT:      Head: Normocephalic and atraumatic  Eyes:      Pupils: Pupils are equal, round, and reactive to light  Cardiovascular:      Rate and Rhythm: Normal rate and regular rhythm  Heart sounds: Normal heart sounds  No murmur heard  Pulmonary:      Effort: Pulmonary effort is normal       Breath sounds: Normal breath sounds  No stridor  No rales  Abdominal:      General: Bowel sounds are normal  There is no distension  Palpations: Abdomen is soft  Tenderness: There is no abdominal tenderness  Musculoskeletal:         General: No deformity  Normal range of motion  Cervical back: Normal range of motion and neck supple  Skin:     General: Skin is warm and dry  Neurological:      Mental Status: He is alert and oriented to person, place, and time            PHQ-2/9 Depression Screening

## 2022-04-01 ENCOUNTER — PROCEDURE VISIT (OUTPATIENT)
Dept: INTERNAL MEDICINE CLINIC | Facility: CLINIC | Age: 62
End: 2022-04-01
Payer: COMMERCIAL

## 2022-04-01 VITALS
BODY MASS INDEX: 21.99 KG/M2 | HEIGHT: 64 IN | DIASTOLIC BLOOD PRESSURE: 66 MMHG | WEIGHT: 128.8 LBS | TEMPERATURE: 97.6 F | RESPIRATION RATE: 12 BRPM | SYSTOLIC BLOOD PRESSURE: 128 MMHG | OXYGEN SATURATION: 98 % | HEART RATE: 90 BPM

## 2022-04-01 DIAGNOSIS — L72.3 SEBACEOUS CYST: ICD-10-CM

## 2022-04-01 DIAGNOSIS — Z12.11 SCREENING FOR COLON CANCER: Primary | ICD-10-CM

## 2022-04-01 PROCEDURE — 10060 I&D ABSCESS SIMPLE/SINGLE: CPT | Performed by: INTERNAL MEDICINE

## 2022-04-01 NOTE — PROGRESS NOTES
Assessment/Plan:    Problem List Items Addressed This Visit     None      Visit Diagnoses     Screening for colon cancer    -  Primary    Relevant Orders    Cologuard           Diagnoses and all orders for this visit:    Screening for colon cancer  -     Cologuard; Future        No problem-specific Assessment & Plan notes found for this encounter  Subjective:      Patient ID: Sallyann Spurling is a 57 y o  male  Small cyst is noted above the right nipple  The following portions of the patient's history were reviewed and updated as appropriate:   He has a past medical history of Kidney stone  ,  does not have any pertinent problems on file  ,   has a past surgical history that includes Kidney stone surgery  ,  family history includes No Known Problems in his father and mother  ,   reports that he has never smoked  He has never used smokeless tobacco  He reports previous alcohol use  He reports that he does not use drugs  ,  has No Known Allergies     Current Outpatient Medications   Medication Sig Dispense Refill    levocetirizine (XYZAL) 5 MG tablet Take 1 tablet (5 mg total) by mouth every evening 30 tablet 2     No current facility-administered medications for this visit  Review of Systems   Constitutional: Negative for chills, fatigue and fever  HENT: Negative  Respiratory: Negative for cough, chest tightness and shortness of breath  Cardiovascular: Negative for chest pain and palpitations  Gastrointestinal: Negative for abdominal pain, constipation, diarrhea, nausea and vomiting  Genitourinary: Negative  Musculoskeletal: Negative for back pain and myalgias  Skin: Negative  Neurological: Negative  Psychiatric/Behavioral: Negative for dysphoric mood  The patient is not nervous/anxious            Objective:  Vitals:    04/01/22 1318   BP: 128/66   BP Location: Right arm   Patient Position: Sitting   Cuff Size: Large   Pulse: 90   Resp: 12   Temp: 97 6 °F (36 4 °C) SpO2: 98%   Weight: 58 4 kg (128 lb 12 8 oz)   Height: 5' 4" (1 626 m)     Body mass index is 22 11 kg/m²  Physical Exam  Vitals and nursing note reviewed  Constitutional:       Appearance: He is well-developed  HENT:      Head: Normocephalic and atraumatic  Eyes:      Pupils: Pupils are equal, round, and reactive to light  Cardiovascular:      Rate and Rhythm: Normal rate and regular rhythm  Heart sounds: Normal heart sounds  No murmur heard  Pulmonary:      Effort: Pulmonary effort is normal       Breath sounds: Normal breath sounds  No stridor  No rales  Abdominal:      General: Bowel sounds are normal  There is no distension  Palpations: Abdomen is soft  Tenderness: There is no abdominal tenderness  Musculoskeletal:         General: No deformity  Normal range of motion  Cervical back: Normal range of motion and neck supple  Skin:     General: Skin is warm and dry  Neurological:      Mental Status: He is alert and oriented to person, place, and time  Comments: Abscess of the anterior chest 2 cm        Incision and Drainage    Date/Time: 4/1/2022 2:19 PM  Performed by: Nawaf Mendoza DO  Authorized by: Nawaf Mendoza DO   Universal Protocol:  Consent: Verbal consent obtained  Consent given by: patient  Timeout called at: 4/1/2022 2:20 PM   Patient understanding: patient states understanding of the procedure being performed      Patient location:  ED  Location:     Type:  Cyst    Size:  2 cm    Location:  Trunk    Trunk location:  R breast  Pre-procedure details:     Skin preparation:  Antiseptic wash  Anesthesia (see MAR for exact dosages):      Anesthesia method:  None  Procedure details:     Complexity:  Simple    Needle aspiration: no      Incision types:  Stab incision    Scalpel blade:  10    Incision depth:  Subcutaneous    Drainage:  Purulent    Drainage amount:  Scant    Wound treatment:  Packing placed    Packing materials:  1/4 in iodoform gauze Amount 1/4" iodoform:  50 mm  Post-procedure details:     Patient tolerance of procedure:   Tolerated well, no immediate complications      KQF-1/2 Depression Screening

## 2022-04-01 NOTE — LETTER
To whom it may concern    Mr Axel Acosta will be following up with our office for a physical in September of this year      Regards    Dr Narayan Lopez

## 2022-04-04 ENCOUNTER — OFFICE VISIT (OUTPATIENT)
Dept: INTERNAL MEDICINE CLINIC | Facility: CLINIC | Age: 62
End: 2022-04-04

## 2022-04-04 VITALS
SYSTOLIC BLOOD PRESSURE: 120 MMHG | BODY MASS INDEX: 22.06 KG/M2 | HEIGHT: 64 IN | WEIGHT: 129.2 LBS | HEART RATE: 91 BPM | DIASTOLIC BLOOD PRESSURE: 60 MMHG | OXYGEN SATURATION: 99 % | TEMPERATURE: 97.9 F

## 2022-04-04 DIAGNOSIS — J30.1 SEASONAL ALLERGIC RHINITIS DUE TO POLLEN: ICD-10-CM

## 2022-04-04 DIAGNOSIS — L72.3 SEBACEOUS CYST: Primary | ICD-10-CM

## 2022-04-04 DIAGNOSIS — M17.11 PRIMARY OSTEOARTHRITIS OF RIGHT KNEE: ICD-10-CM

## 2022-04-04 PROCEDURE — RECHECK: Performed by: INTERNAL MEDICINE

## 2022-04-04 NOTE — PROGRESS NOTES
Assessment/Plan:    Problem List Items Addressed This Visit        Respiratory    Seasonal allergic rhinitis due to pollen       Musculoskeletal and Integument    Primary osteoarthritis of right knee      Other Visit Diagnoses     Sebaceous cyst    -  Primary           Diagnoses and all orders for this visit:    Sebaceous cyst    Seasonal allergic rhinitis due to pollen    Primary osteoarthritis of right knee        No problem-specific Assessment & Plan notes found for this encounter  Subjective:      Patient ID: Sheba Pelayo is a 57 y o  male  The patient was seen and examined s/p removal of a sebaceous cyst from the left area cephalad to the areola  He notes no pain or drainage  He states that he is doing well otherwise  He has a history of OA that is managed with OTC meds and allergic rhinitis that is controlled with Xyzal       The following portions of the patient's history were reviewed and updated as appropriate:   He has a past medical history of Kidney stone  ,  does not have any pertinent problems on file  ,   has a past surgical history that includes Kidney stone surgery  ,  family history includes No Known Problems in his father and mother  ,   reports that he has never smoked  He has never used smokeless tobacco  He reports previous alcohol use  He reports that he does not use drugs  ,  has No Known Allergies     Current Outpatient Medications   Medication Sig Dispense Refill    levocetirizine (XYZAL) 5 MG tablet Take 1 tablet (5 mg total) by mouth every evening (Patient taking differently: Take 5 mg by mouth if needed  ) 30 tablet 2     No current facility-administered medications for this visit  Review of Systems   Constitutional: Negative for chills, fatigue and fever  HENT: Negative  Respiratory: Negative for cough, chest tightness and shortness of breath  Cardiovascular: Negative for chest pain and palpitations     Gastrointestinal: Negative for abdominal pain, constipation, diarrhea, nausea and vomiting  Genitourinary: Negative  Musculoskeletal: Negative for back pain and myalgias  Skin: Positive for wound  Neurological: Negative  Psychiatric/Behavioral: Negative for dysphoric mood  The patient is not nervous/anxious  Objective:  Vitals:    04/04/22 1544   BP: 120/60   Pulse: 91   Temp: 97 9 °F (36 6 °C)   TempSrc: Tympanic   SpO2: 99%   Weight: 58 6 kg (129 lb 3 2 oz)   Height: 5' 4" (1 626 m)     Body mass index is 22 18 kg/m²  Physical Exam  Vitals and nursing note reviewed  Constitutional:       Appearance: He is well-developed  HENT:      Head: Normocephalic and atraumatic  Eyes:      Pupils: Pupils are equal, round, and reactive to light  Cardiovascular:      Rate and Rhythm: Normal rate and regular rhythm  Heart sounds: Normal heart sounds  No murmur heard  Pulmonary:      Effort: Pulmonary effort is normal       Breath sounds: Normal breath sounds  No stridor  No rales  Abdominal:      General: Bowel sounds are normal  There is no distension  Palpations: Abdomen is soft  Tenderness: There is no abdominal tenderness  Musculoskeletal:         General: No deformity  Normal range of motion  Cervical back: Normal range of motion and neck supple  Skin:     General: Skin is warm and dry  Neurological:      Mental Status: He is alert and oriented to person, place, and time            PHQ-2/9 Depression Screening

## 2022-04-18 DIAGNOSIS — Z12.12 ENCOUNTER FOR SCREENING FOR COLORECTAL MALIGNANT NEOPLASM: Primary | ICD-10-CM

## 2022-04-18 DIAGNOSIS — Z12.11 ENCOUNTER FOR SCREENING FOR COLORECTAL MALIGNANT NEOPLASM: Primary | ICD-10-CM

## 2022-04-22 ENCOUNTER — OFFICE VISIT (OUTPATIENT)
Dept: INTERNAL MEDICINE CLINIC | Facility: CLINIC | Age: 62
End: 2022-04-22
Payer: COMMERCIAL

## 2022-04-22 VITALS
WEIGHT: 128.8 LBS | TEMPERATURE: 98.8 F | BODY MASS INDEX: 21.99 KG/M2 | HEART RATE: 84 BPM | DIASTOLIC BLOOD PRESSURE: 64 MMHG | RESPIRATION RATE: 12 BRPM | SYSTOLIC BLOOD PRESSURE: 106 MMHG | OXYGEN SATURATION: 98 % | HEIGHT: 64 IN

## 2022-04-22 DIAGNOSIS — Z13.0 SCREENING, ANEMIA, DEFICIENCY, IRON: ICD-10-CM

## 2022-04-22 DIAGNOSIS — Z13.220 SCREENING FOR HYPERCHOLESTEROLEMIA: ICD-10-CM

## 2022-04-22 DIAGNOSIS — Z12.5 SCREENING PSA (PROSTATE SPECIFIC ANTIGEN): ICD-10-CM

## 2022-04-22 DIAGNOSIS — Z13.1 SCREENING FOR DIABETES MELLITUS: ICD-10-CM

## 2022-04-22 DIAGNOSIS — G47.62 NOCTURNAL LEG CRAMPS: ICD-10-CM

## 2022-04-22 DIAGNOSIS — Z00.00 HEALTHCARE MAINTENANCE: Primary | ICD-10-CM

## 2022-04-22 PROCEDURE — 99396 PREV VISIT EST AGE 40-64: CPT | Performed by: INTERNAL MEDICINE

## 2022-04-22 RX ORDER — CYCLOBENZAPRINE HCL 5 MG
5 TABLET ORAL
Qty: 30 TABLET | Refills: 5 | Status: SHIPPED | OUTPATIENT
Start: 2022-04-22 | End: 2022-06-29

## 2022-04-22 NOTE — PROGRESS NOTES
Assessment/Plan:    Problem List Items Addressed This Visit     None      Visit Diagnoses     Healthcare maintenance    -  Primary    Screening PSA (prostate specific antigen)        Relevant Orders    PSA, Total Screen    Screening for hypercholesterolemia        Relevant Orders    Lipid Panel with Direct LDL reflex    Screening for diabetes mellitus        Relevant Orders    Comprehensive metabolic panel    Screening, anemia, deficiency, iron        Relevant Orders    CBC and differential    Nocturnal leg cramps        Relevant Medications    cyclobenzaprine (FLEXERIL) 5 mg tablet           Diagnoses and all orders for this visit:    Healthcare maintenance    Screening PSA (prostate specific antigen)  -     PSA, Total Screen; Future    Screening for hypercholesterolemia  -     Lipid Panel with Direct LDL reflex; Future    Screening for diabetes mellitus  -     Comprehensive metabolic panel; Future    Screening, anemia, deficiency, iron  -     CBC and differential; Future    Nocturnal leg cramps  -     cyclobenzaprine (FLEXERIL) 5 mg tablet; Take 1 tablet (5 mg total) by mouth daily at bedtime        No problem-specific Assessment & Plan notes found for this encounter  Subjective:      Patient ID: Marcelo Mcbride is a 57 y o  male  No concerns at this time  Here for a PE  The following portions of the patient's history were reviewed and updated as appropriate:   He has a past medical history of Kidney stone  ,  does not have any pertinent problems on file  ,   has a past surgical history that includes Kidney stone surgery  ,  family history includes No Known Problems in his father and mother  ,   reports that he has never smoked  He has never used smokeless tobacco  He reports previous alcohol use  He reports that he does not use drugs  ,  has No Known Allergies     Current Outpatient Medications   Medication Sig Dispense Refill    levocetirizine (XYZAL) 5 MG tablet Take 1 tablet (5 mg total) by mouth every evening (Patient taking differently: Take 5 mg by mouth if needed  ) 30 tablet 2    cyclobenzaprine (FLEXERIL) 5 mg tablet Take 1 tablet (5 mg total) by mouth daily at bedtime 30 tablet 5     No current facility-administered medications for this visit  Review of Systems   Constitutional: Negative for chills, fatigue and fever  HENT: Negative  Respiratory: Negative for cough, chest tightness and shortness of breath  Cardiovascular: Negative for chest pain and palpitations  Gastrointestinal: Negative for abdominal pain, constipation, diarrhea, nausea and vomiting  Genitourinary: Negative  Musculoskeletal: Negative for back pain and myalgias  Skin: Negative  Neurological: Negative  Psychiatric/Behavioral: Negative for dysphoric mood  The patient is not nervous/anxious  Objective:  Vitals:    04/22/22 0947   BP: 106/64   BP Location: Left arm   Patient Position: Sitting   Cuff Size: Standard   Pulse: 84   Resp: 12   Temp: 98 8 °F (37 1 °C)   SpO2: 98%   Weight: 58 4 kg (128 lb 12 8 oz)   Height: 5' 4" (1 626 m)     Body mass index is 22 11 kg/m²  Physical Exam  Vitals and nursing note reviewed  Constitutional:       Appearance: He is well-developed  HENT:      Head: Normocephalic and atraumatic  Eyes:      Pupils: Pupils are equal, round, and reactive to light  Cardiovascular:      Rate and Rhythm: Normal rate and regular rhythm  Heart sounds: Normal heart sounds  No murmur heard  Pulmonary:      Effort: Pulmonary effort is normal       Breath sounds: Normal breath sounds  No stridor  No rales  Abdominal:      General: Bowel sounds are normal  There is no distension  Palpations: Abdomen is soft  Tenderness: There is no abdominal tenderness  Musculoskeletal:         General: No deformity  Normal range of motion  Cervical back: Normal range of motion and neck supple  Skin:     General: Skin is warm and dry     Neurological: Mental Status: He is alert and oriented to person, place, and time            PHQ-2/9 Depression Screening    Little interest or pleasure in doing things: 0 - not at all  Feeling down, depressed, or hopeless: 0 - not at all  PHQ-2 Score: 0  PHQ-2 Interpretation: Negative depression screen

## 2022-04-29 LAB — COLOGUARD RESULT REPORTABLE: NEGATIVE

## 2022-06-24 ENCOUNTER — HOSPITAL ENCOUNTER (EMERGENCY)
Facility: HOSPITAL | Age: 62
Discharge: HOME | End: 2022-06-25
Attending: EMERGENCY MEDICINE | Admitting: EMERGENCY MEDICINE
Payer: COMMERCIAL

## 2022-06-24 ENCOUNTER — APPOINTMENT (EMERGENCY)
Dept: CT IMAGING | Facility: HOSPITAL | Age: 62
End: 2022-06-24
Payer: COMMERCIAL

## 2022-06-24 DIAGNOSIS — K52.9 COLITIS: Primary | ICD-10-CM

## 2022-06-24 PROCEDURE — 85025 COMPLETE CBC W/AUTO DIFF WBC: CPT | Performed by: PHYSICIAN ASSISTANT

## 2022-06-24 PROCEDURE — 99284 EMERGENCY DEPT VISIT MOD MDM: CPT

## 2022-06-24 PROCEDURE — 74176 CT ABD & PELVIS W/O CONTRAST: CPT

## 2022-06-24 PROCEDURE — 99284 EMERGENCY DEPT VISIT MOD MDM: CPT | Performed by: PHYSICIAN ASSISTANT

## 2022-06-24 PROCEDURE — 83690 ASSAY OF LIPASE: CPT | Performed by: PHYSICIAN ASSISTANT

## 2022-06-24 PROCEDURE — 36415 COLL VENOUS BLD VENIPUNCTURE: CPT | Performed by: PHYSICIAN ASSISTANT

## 2022-06-24 PROCEDURE — 84484 ASSAY OF TROPONIN QUANT: CPT | Performed by: PHYSICIAN ASSISTANT

## 2022-06-24 PROCEDURE — 80053 COMPREHEN METABOLIC PANEL: CPT | Performed by: PHYSICIAN ASSISTANT

## 2022-06-24 PROCEDURE — 96374 THER/PROPH/DIAG INJ IV PUSH: CPT

## 2022-06-24 RX ORDER — FAMOTIDINE 10 MG/ML
INJECTION, SOLUTION INTRAVENOUS
Status: COMPLETED
Start: 2022-06-24 | End: 2022-06-24

## 2022-06-24 RX ORDER — FAMOTIDINE 10 MG/ML
20 INJECTION, SOLUTION INTRAVENOUS DAILY
Status: DISCONTINUED | OUTPATIENT
Start: 2022-06-25 | End: 2022-06-25 | Stop reason: HOSPADM

## 2022-06-24 RX ADMIN — FAMOTIDINE 20 MG: 10 INJECTION INTRAVENOUS at 23:56

## 2022-06-24 RX ADMIN — FAMOTIDINE 20 MG: 10 INJECTION, SOLUTION INTRAVENOUS at 23:56

## 2022-06-25 VITALS
DIASTOLIC BLOOD PRESSURE: 78 MMHG | WEIGHT: 125 LBS | TEMPERATURE: 98 F | RESPIRATION RATE: 16 BRPM | HEART RATE: 70 BPM | SYSTOLIC BLOOD PRESSURE: 133 MMHG | OXYGEN SATURATION: 100 % | BODY MASS INDEX: 21.46 KG/M2

## 2022-06-25 LAB
ALBUMIN SERPL BCP-MCNC: 4.4 G/DL (ref 3.5–5)
ALP SERPL-CCNC: 54 U/L (ref 34–104)
ALT SERPL W P-5'-P-CCNC: 23 U/L (ref 7–52)
ANION GAP SERPL CALCULATED.3IONS-SCNC: 7 MMOL/L (ref 4–13)
AST SERPL W P-5'-P-CCNC: 22 U/L (ref 13–39)
ATRIAL RATE: 66 BPM
BASOPHILS # BLD AUTO: 0.02 THOUSANDS/ΜL (ref 0–0.1)
BASOPHILS NFR BLD AUTO: 0 % (ref 0–1)
BILIRUB SERPL-MCNC: 0.62 MG/DL (ref 0.2–1)
BILIRUB UR QL STRIP: NEGATIVE
BUN SERPL-MCNC: 17 MG/DL (ref 5–25)
CALCIUM SERPL-MCNC: 9.4 MG/DL (ref 8.4–10.2)
CARDIAC TROPONIN I PNL SERPL HS: 3 NG/L
CHLORIDE SERPL-SCNC: 103 MMOL/L (ref 96–108)
CLARITY UR: CLEAR
CO2 SERPL-SCNC: 28 MMOL/L (ref 21–32)
COLOR UR: YELLOW
CREAT SERPL-MCNC: 0.95 MG/DL (ref 0.6–1.3)
EOSINOPHIL # BLD AUTO: 0.05 THOUSAND/ΜL (ref 0–0.61)
EOSINOPHIL NFR BLD AUTO: 1 % (ref 0–6)
ERYTHROCYTE [DISTWIDTH] IN BLOOD BY AUTOMATED COUNT: 13.3 % (ref 11.6–15.1)
GFR SERPL CREATININE-BSD FRML MDRD: 84 ML/MIN/1.73SQ M
GLUCOSE SERPL-MCNC: 99 MG/DL (ref 65–140)
GLUCOSE UR STRIP-MCNC: NEGATIVE MG/DL
HCT VFR BLD AUTO: 40.2 % (ref 36.5–49.3)
HGB BLD-MCNC: 13.6 G/DL (ref 12–17)
HGB UR QL STRIP.AUTO: NEGATIVE
IMM GRANULOCYTES # BLD AUTO: 0.01 THOUSAND/UL (ref 0–0.2)
IMM GRANULOCYTES NFR BLD AUTO: 0 % (ref 0–2)
KETONES UR STRIP-MCNC: NEGATIVE MG/DL
LEUKOCYTE ESTERASE UR QL STRIP: NEGATIVE
LIPASE SERPL-CCNC: 35 U/L (ref 11–82)
LYMPHOCYTES # BLD AUTO: 1.1 THOUSANDS/ΜL (ref 0.6–4.47)
LYMPHOCYTES NFR BLD AUTO: 24 % (ref 14–44)
MCH RBC QN AUTO: 29.8 PG (ref 26.8–34.3)
MCHC RBC AUTO-ENTMCNC: 33.8 G/DL (ref 31.4–37.4)
MCV RBC AUTO: 88 FL (ref 82–98)
MONOCYTES # BLD AUTO: 0.46 THOUSAND/ΜL (ref 0.17–1.22)
MONOCYTES NFR BLD AUTO: 10 % (ref 4–12)
NEUTROPHILS # BLD AUTO: 2.95 THOUSANDS/ΜL (ref 1.85–7.62)
NEUTS SEG NFR BLD AUTO: 65 % (ref 43–75)
NITRITE UR QL STRIP: NEGATIVE
NRBC BLD AUTO-RTO: 0 /100 WBCS
P AXIS: 72 DEGREES
PH UR STRIP.AUTO: 6 [PH]
PLATELET # BLD AUTO: 173 THOUSANDS/UL (ref 149–390)
PMV BLD AUTO: 10.1 FL (ref 8.9–12.7)
POTASSIUM SERPL-SCNC: 4 MMOL/L (ref 3.5–5.3)
PR INTERVAL: 172 MS
PROT SERPL-MCNC: 6.6 G/DL (ref 6.4–8.4)
PROT UR STRIP-MCNC: NEGATIVE MG/DL
QRS AXIS: 21 DEGREES
QRSD INTERVAL: 82 MS
QT INTERVAL: 390 MS
QTC INTERVAL: 408 MS
RBC # BLD AUTO: 4.56 MILLION/UL (ref 3.88–5.62)
SODIUM SERPL-SCNC: 138 MMOL/L (ref 135–147)
SP GR UR STRIP.AUTO: 1.01 (ref 1–1.03)
T WAVE AXIS: 27 DEGREES
UROBILINOGEN UR QL STRIP.AUTO: 0.2 E.U./DL
VENTRICULAR RATE: 66 BPM
WBC # BLD AUTO: 4.59 THOUSAND/UL (ref 4.31–10.16)

## 2022-06-25 PROCEDURE — 96361 HYDRATE IV INFUSION ADD-ON: CPT

## 2022-06-25 PROCEDURE — 93005 ELECTROCARDIOGRAM TRACING: CPT

## 2022-06-25 PROCEDURE — 96375 TX/PRO/DX INJ NEW DRUG ADDON: CPT

## 2022-06-25 PROCEDURE — 93010 ELECTROCARDIOGRAM REPORT: CPT | Performed by: INTERNAL MEDICINE

## 2022-06-25 PROCEDURE — 81003 URINALYSIS AUTO W/O SCOPE: CPT | Performed by: PHYSICIAN ASSISTANT

## 2022-06-25 RX ORDER — KETOROLAC TROMETHAMINE 30 MG/ML
15 INJECTION, SOLUTION INTRAMUSCULAR; INTRAVENOUS ONCE
Status: COMPLETED | OUTPATIENT
Start: 2022-06-25 | End: 2022-06-25

## 2022-06-25 RX ADMIN — SODIUM CHLORIDE 1000 ML: 0.9 INJECTION, SOLUTION INTRAVENOUS at 00:07

## 2022-06-25 RX ADMIN — KETOROLAC TROMETHAMINE 15 MG: 30 INJECTION, SOLUTION INTRAMUSCULAR at 01:07

## 2022-06-25 NOTE — ED PROVIDER NOTES
History  Chief Complaint   Patient presents with    Abdominal Pain     Epigastric   sharp in nature,   denies nvd or constipation     64-year-old male history of nephrolithiasis presents complaining of epigastric pain  Patient reports that is been present for the past 1-2 weeks  Patient reports it was more severe today  Describes a 7/10 pain that is sharp epigastric pain radiating up into his chest   He does described it is sometimes a burning sensation  Reports some loose stools  States pain is worse having a bowel movement  Has not taken anything for his symptoms  Denies any fevers, chills, chest pain, shortness of breath, pleuritic pain, vomiting, melena hematochezia, urinary burning, frequency or hematuria  Denies any prior abdominal surgeries or history of IBS, Crohn's or ulcerative colitis  Prior to Admission Medications   Prescriptions Last Dose Informant Patient Reported? Taking? cyclobenzaprine (FLEXERIL) 5 mg tablet   No No   Sig: Take 1 tablet (5 mg total) by mouth daily at bedtime   levocetirizine (XYZAL) 5 MG tablet   No No   Sig: Take 1 tablet (5 mg total) by mouth every evening   Patient taking differently: Take 5 mg by mouth if needed        Facility-Administered Medications: None       Past Medical History:   Diagnosis Date    Kidney stone        Past Surgical History:   Procedure Laterality Date    KIDNEY STONE SURGERY         Family History   Problem Relation Age of Onset    No Known Problems Mother     No Known Problems Father      I have reviewed and agree with the history as documented      E-Cigarette/Vaping    E-Cigarette Use Never User      E-Cigarette/Vaping Substances    Nicotine No     THC No     CBD No     Flavoring No     Other No     Unknown No      Social History     Tobacco Use    Smoking status: Never Smoker    Smokeless tobacco: Never Used   Vaping Use    Vaping Use: Never used   Substance Use Topics    Alcohol use: Not Currently    Drug use: Never       Review of Systems   Constitutional: Negative for chills, fatigue and fever  HENT: Negative for congestion and sore throat  Eyes: Negative for pain  Respiratory: Negative for cough, chest tightness, shortness of breath and wheezing  Cardiovascular: Negative for chest pain, palpitations and leg swelling  Gastrointestinal: Positive for abdominal pain and diarrhea  Negative for constipation, nausea and vomiting  Endocrine: Negative for polyuria  Genitourinary: Negative for dysuria  Musculoskeletal: Negative for arthralgias, back pain, myalgias and neck pain  Skin: Negative for rash  Neurological: Negative for dizziness, syncope, light-headedness and headaches  All other systems reviewed and are negative  Physical Exam  Physical Exam  Vitals reviewed  Constitutional:       Appearance: Normal appearance  He is well-developed  HENT:      Head: Normocephalic and atraumatic  Mouth/Throat:      Mouth: Mucous membranes are moist    Eyes:      Conjunctiva/sclera: Conjunctivae normal    Cardiovascular:      Rate and Rhythm: Normal rate and regular rhythm  Heart sounds: Normal heart sounds  Pulmonary:      Effort: Pulmonary effort is normal       Breath sounds: Normal breath sounds  Abdominal:      General: Bowel sounds are normal       Palpations: Abdomen is soft  Tenderness: There is generalized abdominal tenderness  Musculoskeletal:         General: Normal range of motion  Cervical back: Normal range of motion  Skin:     General: Skin is warm and dry  Capillary Refill: Capillary refill takes less than 2 seconds  Neurological:      General: No focal deficit present  Mental Status: He is alert and oriented to person, place, and time     Psychiatric:         Mood and Affect: Mood normal          Behavior: Behavior normal          Vital Signs  ED Triage Vitals [06/24/22 2222]   Temperature Pulse Respirations Blood Pressure SpO2   98 °F (36 7 °C) 74 16 122/71 99 %      Temp src Heart Rate Source Patient Position - Orthostatic VS BP Location FiO2 (%)   -- -- -- -- --      Pain Score       9           Vitals:    06/24/22 2222   BP: 122/71   Pulse: 74         Visual Acuity      ED Medications  Medications   Famotidine (PF) (PEPCID) injection 20 mg (20 mg Intravenous Given 6/24/22 2356)   sodium chloride 0 9 % bolus 1,000 mL (1,000 mL Intravenous New Bag 6/25/22 0007)       Diagnostic Studies  Results Reviewed     Procedure Component Value Units Date/Time    UA w Reflex to Microscopic w Reflex to Culture [[id number]] Collected: 06/25/22 0021    Lab Status: In process Specimen: Urine, Clean Catch Updated: 06/25/22 0024    CBC and differential [[id number]] Collected: 06/24/22 2357    Lab Status: Final result Specimen: Blood from Arm, Right Updated: 06/25/22 0012     WBC 4 59 Thousand/uL      RBC 4 56 Million/uL      Hemoglobin 13 6 g/dL      Hematocrit 40 2 %      MCV 88 fL      MCH 29 8 pg      MCHC 33 8 g/dL      RDW 13 3 %      MPV 10 1 fL      Platelets 428 Thousands/uL      nRBC 0 /100 WBCs      Neutrophils Relative 65 %      Immat GRANS % 0 %      Lymphocytes Relative 24 %      Monocytes Relative 10 %      Eosinophils Relative 1 %      Basophils Relative 0 %      Neutrophils Absolute 2 95 Thousands/µL      Immature Grans Absolute 0 01 Thousand/uL      Lymphocytes Absolute 1 10 Thousands/µL      Monocytes Absolute 0 46 Thousand/µL      Eosinophils Absolute 0 05 Thousand/µL      Basophils Absolute 0 02 Thousands/µL     HS Troponin 0hr (reflex protocol) [[id number]] Collected: 06/24/22 2357    Lab Status: In process Specimen: Blood from Arm, Right Updated: 06/25/22 0009    CMP [[id number]] Collected: 06/24/22 2357    Lab Status: In process Specimen: Blood from Arm, Right Updated: 06/25/22 0009    Lipase [[id number]] Collected: 06/24/22 2357    Lab Status:  In process Specimen: Blood from Arm, Right Updated: 06/25/22 0009                 CT abdomen pelvis without contrast   Final Result by Vandana Olvera MD (06/24 0131)      Findings suggesting mild colitis            Workstation performed: [id number]                    Procedures  Procedures         ED Course                               SBIRT 20yo+    Flowsheet Row Most Recent Value   SBIRT (25 yo +)    In order to provide better care to our patients, we are screening all of our patients for alcohol and drug use  Would it be okay to ask you these screening questions? Yes Filed at: 06/25/2022 0024   Initial Alcohol Screen: US AUDIT-C     1  How often do you have a drink containing alcohol? 0 Filed at: 06/25/2022 0024   2  How many drinks containing alcohol do you have on a typical day you are drinking? 0 Filed at: 06/25/2022 0024   3a  Male UNDER 65: How often do you have five or more drinks on one occasion? 0 Filed at: 06/25/2022 0024   3b  FEMALE Any Age, or MALE 65+: How often do you have 4 or more drinks on one occassion? 0 Filed at: 06/25/2022 0024   Audit-C Score 0 Filed at: 06/25/2022 9444   ILEANA: How many times in the past year have you    Used an illegal drug or used a prescription medication for non-medical reasons? Never Filed at: 06/25/2022 0024                    MDM  Number of Diagnoses or Management Options  Diagnosis management comments: Patient clinically well-appearing  Minimal abdominal tenderness however patient had some generalized tenderness in the epigastric region  Suspect gastritis versus pancreatitis versus ACS  Will obtain EKG, troponin, labs, CT abdomen and pelvis with IV contrast   No CVA tenderness however given patient's history of nephrolithiasis, will obtain UA and assess for stone on CT  Patient signed out to Dr Madhavi Velazquez with likely plan for outpatient f/u assuming ACS can be ruled out        Disposition  Final diagnoses:   None     ED Disposition     None      Follow-up Information    None         Patient's Medications   Discharge Prescriptions    No medications on file       No discharge procedures on file      PDMP Review     None          ED Provider  Electronically Signed by           Lexy Jaffe PA-C  06/25/22 4401

## 2022-06-29 ENCOUNTER — OFFICE VISIT (OUTPATIENT)
Dept: INTERNAL MEDICINE CLINIC | Facility: CLINIC | Age: 62
End: 2022-06-29
Payer: COMMERCIAL

## 2022-06-29 VITALS
WEIGHT: 126 LBS | HEIGHT: 64 IN | BODY MASS INDEX: 21.51 KG/M2 | RESPIRATION RATE: 12 BRPM | HEART RATE: 71 BPM | OXYGEN SATURATION: 98 % | TEMPERATURE: 98.4 F | DIASTOLIC BLOOD PRESSURE: 60 MMHG | SYSTOLIC BLOOD PRESSURE: 104 MMHG

## 2022-06-29 DIAGNOSIS — K52.9 GASTROENTERITIS: Primary | ICD-10-CM

## 2022-06-29 DIAGNOSIS — G47.62 NOCTURNAL LEG CRAMPS: ICD-10-CM

## 2022-06-29 DIAGNOSIS — J30.1 SEASONAL ALLERGIC RHINITIS DUE TO POLLEN: ICD-10-CM

## 2022-06-29 PROCEDURE — 99213 OFFICE O/P EST LOW 20 MIN: CPT | Performed by: INTERNAL MEDICINE

## 2022-06-29 RX ORDER — CYCLOBENZAPRINE HCL 5 MG
5 TABLET ORAL
Qty: 30 TABLET | Refills: 5 | Status: SHIPPED | OUTPATIENT
Start: 2022-06-29 | End: 2022-12-26

## 2022-06-29 RX ORDER — LEVOCETIRIZINE DIHYDROCHLORIDE 5 MG/1
5 TABLET, FILM COATED ORAL EVERY EVENING
Qty: 30 TABLET | Refills: 2 | Status: SHIPPED | OUTPATIENT
Start: 2022-06-29 | End: 2022-09-27

## 2022-06-29 NOTE — PROGRESS NOTES
Assessment/Plan:    Problem List Items Addressed This Visit        Respiratory    Seasonal allergic rhinitis due to pollen     Xyzal helping and refill           Relevant Medications    levocetirizine (XYZAL) 5 MG tablet      Other Visit Diagnoses     Gastroenteritis    -  Primary    Resolved at this time    Nocturnal leg cramps        Relevant Medications    cyclobenzaprine (FLEXERIL) 5 mg tablet           Diagnoses and all orders for this visit:    Gastroenteritis  Comments:  Resolved at this time    Nocturnal leg cramps  -     cyclobenzaprine (FLEXERIL) 5 mg tablet; Take 1 tablet (5 mg total) by mouth daily at bedtime    Seasonal allergic rhinitis due to pollen  -     levocetirizine (XYZAL) 5 MG tablet; Take 1 tablet (5 mg total) by mouth every evening      Seasonal allergic rhinitis due to pollen  Xyzal helping and refill    Primary osteoarthritis of right knee  Nocturnal leg cramps resolved with current medications        Subjective:      Patient ID: Rg Roy is a 57 y o  male  At the ER with symptoms of gastroenteritis and labs, EKG and imaging reviewed and no concerns at this time  The following portions of the patient's history were reviewed and updated as appropriate:   He has a past medical history of Kidney stone  ,  does not have any pertinent problems on file  ,   has a past surgical history that includes Kidney stone surgery  ,  family history includes No Known Problems in his father and mother  ,   reports that he has never smoked  He has never used smokeless tobacco  He reports previous alcohol use  He reports that he does not use drugs  ,  has No Known Allergies     Current Outpatient Medications   Medication Sig Dispense Refill    cyclobenzaprine (FLEXERIL) 5 mg tablet Take 1 tablet (5 mg total) by mouth daily at bedtime 30 tablet 5    levocetirizine (XYZAL) 5 MG tablet Take 1 tablet (5 mg total) by mouth every evening 30 tablet 2     No current facility-administered medications for this visit  Review of Systems   Constitutional: Negative for chills, fatigue and fever  HENT: Negative  Respiratory: Negative for cough, chest tightness and shortness of breath  Cardiovascular: Negative for chest pain and palpitations  Gastrointestinal: Negative for abdominal pain, constipation, diarrhea, nausea and vomiting  Genitourinary: Negative  Musculoskeletal: Negative for back pain and myalgias  Skin: Negative  Neurological: Negative  Psychiatric/Behavioral: Negative for dysphoric mood  The patient is not nervous/anxious  Objective:  Vitals:    06/29/22 0818   BP: 104/60   BP Location: Left arm   Patient Position: Sitting   Cuff Size: Child   Pulse: 71   Resp: 12   Temp: 98 4 °F (36 9 °C)   SpO2: 98%   Weight: 57 2 kg (126 lb)   Height: 5' 4" (1 626 m)     Body mass index is 21 63 kg/m²  Physical Exam  Vitals and nursing note reviewed  Constitutional:       Appearance: He is well-developed  HENT:      Head: Normocephalic and atraumatic  Eyes:      Pupils: Pupils are equal, round, and reactive to light  Cardiovascular:      Rate and Rhythm: Normal rate and regular rhythm  Heart sounds: Normal heart sounds  No murmur heard  Pulmonary:      Effort: Pulmonary effort is normal       Breath sounds: Normal breath sounds  No stridor  No rales  Abdominal:      General: Bowel sounds are normal  There is no distension  Palpations: Abdomen is soft  Tenderness: There is no abdominal tenderness  Musculoskeletal:         General: No deformity  Normal range of motion  Cervical back: Normal range of motion and neck supple  Skin:     General: Skin is warm and dry  Neurological:      Mental Status: He is alert and oriented to person, place, and time            PHQ-2/9 Depression Screening

## 2022-08-12 ENCOUNTER — OFFICE VISIT (OUTPATIENT)
Dept: INTERNAL MEDICINE CLINIC | Facility: CLINIC | Age: 62
End: 2022-08-12
Payer: COMMERCIAL

## 2022-08-12 ENCOUNTER — APPOINTMENT (OUTPATIENT)
Dept: RADIOLOGY | Facility: CLINIC | Age: 62
End: 2022-08-12
Payer: COMMERCIAL

## 2022-08-12 VITALS
DIASTOLIC BLOOD PRESSURE: 64 MMHG | TEMPERATURE: 98 F | HEIGHT: 64 IN | OXYGEN SATURATION: 99 % | HEART RATE: 71 BPM | BODY MASS INDEX: 21.07 KG/M2 | SYSTOLIC BLOOD PRESSURE: 110 MMHG | WEIGHT: 123.4 LBS

## 2022-08-12 DIAGNOSIS — M19.071 PRIMARY OSTEOARTHRITIS OF BOTH FEET: Primary | ICD-10-CM

## 2022-08-12 DIAGNOSIS — M19.072 PRIMARY OSTEOARTHRITIS OF BOTH FEET: ICD-10-CM

## 2022-08-12 DIAGNOSIS — M19.071 PRIMARY OSTEOARTHRITIS OF BOTH FEET: ICD-10-CM

## 2022-08-12 DIAGNOSIS — M19.072 PRIMARY OSTEOARTHRITIS OF BOTH FEET: Primary | ICD-10-CM

## 2022-08-12 DIAGNOSIS — M17.11 PRIMARY OSTEOARTHRITIS OF RIGHT KNEE: ICD-10-CM

## 2022-08-12 PROCEDURE — 73630 X-RAY EXAM OF FOOT: CPT

## 2022-08-12 PROCEDURE — 99214 OFFICE O/P EST MOD 30 MIN: CPT | Performed by: INTERNAL MEDICINE

## 2022-08-12 RX ORDER — NAPROXEN 500 MG/1
500 TABLET ORAL 2 TIMES DAILY PRN
Qty: 60 TABLET | Refills: 0 | Status: SHIPPED | OUTPATIENT
Start: 2022-08-12 | End: 2022-10-05

## 2022-08-12 NOTE — PROGRESS NOTES
Assessment/Plan:    Problem List Items Addressed This Visit        Musculoskeletal and Integument    Primary osteoarthritis of right knee     Trial of Naproxen 500 mg PO BID and consider steroid injection  Other Visit Diagnoses     Primary osteoarthritis of both feet    -  Primary    Relevant Medications    naproxen (Naprosyn) 500 mg tablet    Other Relevant Orders    XR foot 3+ vw right    XR foot 3+ vw left           Diagnoses and all orders for this visit:    Primary osteoarthritis of both feet  -     naproxen (Naprosyn) 500 mg tablet; Take 1 tablet (500 mg total) by mouth 2 (two) times a day as needed for mild pain  -     XR foot 3+ vw right; Future  -     XR foot 3+ vw left; Future    Primary osteoarthritis of right knee      Primary osteoarthritis of right knee  Trial of Naproxen 500 mg PO BID and consider steroid injection  Subjective:      Patient ID: Zeeshan Renee is a 57 y o  male  The patient has pain in the dorsum of his bilateral feet and pain in the area of the posterior ankle  The patient states that he has pain in the bilateral ankle  We reviewed and discussed the medications for ankle  Knee Pain         The following portions of the patient's history were reviewed and updated as appropriate:   He has a past medical history of Kidney stone  ,  does not have any pertinent problems on file  ,   has a past surgical history that includes Kidney stone surgery  ,  family history includes No Known Problems in his father and mother  ,   reports that he has never smoked  He has never used smokeless tobacco  He reports previous alcohol use  He reports that he does not use drugs  ,  has No Known Allergies     Current Outpatient Medications   Medication Sig Dispense Refill    cyclobenzaprine (FLEXERIL) 5 mg tablet Take 1 tablet (5 mg total) by mouth daily at bedtime 30 tablet 5    levocetirizine (XYZAL) 5 MG tablet Take 1 tablet (5 mg total) by mouth every evening 30 tablet 2  naproxen (Naprosyn) 500 mg tablet Take 1 tablet (500 mg total) by mouth 2 (two) times a day as needed for mild pain 60 tablet 0     No current facility-administered medications for this visit  Review of Systems   Constitutional: Negative for chills, fatigue and fever  HENT: Negative  Respiratory: Negative for cough, chest tightness and shortness of breath  Cardiovascular: Negative for chest pain and palpitations  Gastrointestinal: Negative for abdominal pain, constipation, diarrhea, nausea and vomiting  Genitourinary: Negative  Musculoskeletal: Negative for back pain and myalgias  Skin: Negative  Neurological: Negative  Psychiatric/Behavioral: Negative for dysphoric mood  The patient is not nervous/anxious  Objective:  Vitals:    08/12/22 1030   BP: 110/64   Pulse: 71   Temp: 98 °F (36 7 °C)   TempSrc: Tympanic   SpO2: 99%   Weight: 56 kg (123 lb 6 4 oz)   Height: 5' 4" (1 626 m)     Body mass index is 21 18 kg/m²  Physical Exam  Vitals and nursing note reviewed  Constitutional:       Appearance: He is well-developed  HENT:      Head: Normocephalic and atraumatic  Eyes:      Pupils: Pupils are equal, round, and reactive to light  Cardiovascular:      Rate and Rhythm: Normal rate and regular rhythm  Heart sounds: Normal heart sounds  No murmur heard  Pulmonary:      Effort: Pulmonary effort is normal       Breath sounds: Normal breath sounds  No stridor  No rales  Abdominal:      General: Bowel sounds are normal  There is no distension  Palpations: Abdomen is soft  Tenderness: There is no abdominal tenderness  Musculoskeletal:         General: No deformity  Normal range of motion  Cervical back: Normal range of motion and neck supple  Skin:     General: Skin is warm and dry  Neurological:      Mental Status: He is alert and oriented to person, place, and time            PHQ-2/9 Depression Screening

## 2022-08-19 DIAGNOSIS — M77.32 HEEL SPUR, LEFT: Primary | ICD-10-CM

## 2022-08-19 DIAGNOSIS — M21.611 BILATERAL BUNIONS: ICD-10-CM

## 2022-08-19 DIAGNOSIS — M21.612 BILATERAL BUNIONS: ICD-10-CM

## 2022-08-26 ENCOUNTER — VBI (OUTPATIENT)
Dept: ADMINISTRATIVE | Facility: OTHER | Age: 62
End: 2022-08-26

## 2022-09-07 ENCOUNTER — OFFICE VISIT (OUTPATIENT)
Dept: PODIATRY | Facility: CLINIC | Age: 62
End: 2022-09-07
Payer: COMMERCIAL

## 2022-09-07 ENCOUNTER — OFFICE VISIT (OUTPATIENT)
Dept: URGENT CARE | Facility: CLINIC | Age: 62
End: 2022-09-07
Payer: COMMERCIAL

## 2022-09-07 VITALS
OXYGEN SATURATION: 100 % | SYSTOLIC BLOOD PRESSURE: 130 MMHG | DIASTOLIC BLOOD PRESSURE: 61 MMHG | TEMPERATURE: 98.5 F | HEART RATE: 63 BPM | BODY MASS INDEX: 21.85 KG/M2 | WEIGHT: 128 LBS | RESPIRATION RATE: 18 BRPM | HEIGHT: 64 IN

## 2022-09-07 VITALS — OXYGEN SATURATION: 98 % | BODY MASS INDEX: 21 KG/M2 | HEIGHT: 64 IN | WEIGHT: 123 LBS | HEART RATE: 63 BPM

## 2022-09-07 DIAGNOSIS — M79.672 PAIN IN BOTH FEET: Primary | ICD-10-CM

## 2022-09-07 DIAGNOSIS — L08.9 LOCAL INFECTION OF THE SKIN AND SUBCUTANEOUS TISSUE, UNSPECIFIED: Primary | ICD-10-CM

## 2022-09-07 DIAGNOSIS — M19.072 ARTHRITIS OF BOTH FEET: ICD-10-CM

## 2022-09-07 DIAGNOSIS — M19.071 ARTHRITIS OF BOTH FEET: ICD-10-CM

## 2022-09-07 DIAGNOSIS — M79.671 PAIN IN BOTH FEET: Primary | ICD-10-CM

## 2022-09-07 PROCEDURE — 99243 OFF/OP CNSLTJ NEW/EST LOW 30: CPT | Performed by: STUDENT IN AN ORGANIZED HEALTH CARE EDUCATION/TRAINING PROGRAM

## 2022-09-07 PROCEDURE — 99213 OFFICE O/P EST LOW 20 MIN: CPT | Performed by: PHYSICIAN ASSISTANT

## 2022-09-07 RX ORDER — CEPHALEXIN 500 MG/1
500 CAPSULE ORAL EVERY 8 HOURS SCHEDULED
Qty: 21 CAPSULE | Refills: 0 | Status: SHIPPED | OUTPATIENT
Start: 2022-09-07 | End: 2022-09-14

## 2022-09-07 NOTE — PROGRESS NOTES
[facility]      NAME: Daniela Young is a 57 y o  male  : 1960    MRN: [id number]  DATE: 2022  TIME: 12:42 PM    Assessment and Plan   Local infection of the skin and subcutaneous tissue, unspecified [L08 9]  1  Local infection of the skin and subcutaneous tissue, unspecified  cephalexin (KEFLEX) 500 mg capsule       Patient Instructions    Keflex as prescribed with probiotics  Can use OTC ibu q 4-6 hours prn with food for pain/inflammation as well  Follow up with PCP In 3-5 days  Patient agreeable to plan  To present to the ER if symptoms worsen  Chief Complaint     Chief Complaint   Patient presents with    Hand Pain     Swollen right middle finger, very painful, happened a week ago doing yard work         History of Present Illness   José Antonio Payne presents to the clinic c/o    Patient right handed  Hand Pain   The incident occurred more than 1 week ago  The incident occurred at home  Injury mechanism: thorn from [employer] pricked his right middle finger while gardening  Pain location: right distal middle finger  The quality of the pain is described as aching  The pain is mild  The pain has been constant since the incident  Pertinent negatives include no chest pain  Nothing aggravates the symptoms  He has tried NSAIDs for the symptoms  The treatment provided mild relief  Review of Systems   Review of Systems   Constitutional: Negative for chills, diaphoresis, fatigue and fever  HENT: Negative for congestion, ear discharge, ear pain and facial swelling  Eyes: Negative for photophobia, pain, discharge, redness, itching and visual disturbance  Respiratory: Negative for apnea, cough, chest tightness, shortness of breath and wheezing  Cardiovascular: Negative for chest pain and palpitations  Gastrointestinal: Negative for abdominal pain  Musculoskeletal: Positive for joint swelling  Skin: Positive for color change   Negative for rash and wound    Neurological: Negative for dizziness and headaches  Hematological: Negative for adenopathy           Current Medications     Long-Term Medications   Medication Sig Dispense Refill    cyclobenzaprine (FLEXERIL) 5 mg tablet Take 1 tablet (5 mg total) by mouth daily at bedtime 30 tablet 5    Diclofenac Sodium (VOLTAREN) 1 % Apply 2 g topically 4 (four) times a day 150 g 2    levocetirizine (XYZAL) 5 MG tablet Take 1 tablet (5 mg total) by mouth every evening 30 tablet 2    naproxen (Naprosyn) 500 mg tablet Take 1 tablet (500 mg total) by mouth 2 (two) times a day as needed for mild pain (Patient not taking: Reported on 9/7/2022) 60 tablet 0       Current Allergies     Allergies as of 09/07/2022    (No Known Allergies)            The following portions of the patient's history were reviewed and updated as appropriate: allergies, current medications, past family history, past medical history, past social history, past surgical history and problem list   Past Medical History:   Diagnosis Date    Kidney stone      Past Surgical History:   Procedure Laterality Date    KIDNEY STONE SURGERY       Social History     Socioeconomic History    Marital status: /Civil Union     Spouse name: Not on file    Number of children: Not on file    Years of education: Not on file    Highest education level: Not on file   Occupational History    Occupation: employed   Tobacco Use    Smoking status: Never Smoker    Smokeless tobacco: Never Used   Vaping Use    Vaping Use: Never used   Substance and Sexual Activity    Alcohol use: Not Currently    Drug use: Never    Sexual activity: Not on file   Other Topics Concern    Not on file   Social History Narrative        employed     Social Determinants of Health     Financial Resource Strain: Not on file   Food Insecurity: Not on file   Transportation Needs: Not on file   Physical Activity: Not on file   Stress: Not on file   Social Connections: Not on file   Intimate Partner Violence: Not on file   Housing Stability: Not on file       Objective   /61   Pulse 63   Temp 98 5 °F (36 9 °C)   Resp 18   Ht 5' 4" (1 626 m)   Wt 58 1 kg (128 lb)   SpO2 100%   BMI 21 97 kg/m²      Physical Exam     Physical Exam  Vitals and nursing note reviewed  Constitutional:       General: He is not in acute distress  Appearance: He is well-developed  He is not diaphoretic  HENT:      Head: Normocephalic and atraumatic  Right Ear: External ear normal       Left Ear: External ear normal       Nose: Nose normal    Eyes:      General: No scleral icterus  Right eye: No discharge  Left eye: No discharge  Conjunctiva/sclera: Conjunctivae normal    Cardiovascular:      Rate and Rhythm: Normal rate and regular rhythm  Heart sounds: Normal heart sounds  No murmur heard  No friction rub  No gallop  Pulmonary:      Effort: Pulmonary effort is normal  No respiratory distress  Breath sounds: Normal breath sounds  No decreased breath sounds, wheezing, rhonchi or rales  Musculoskeletal:        Hands:    Skin:     General: Skin is warm and dry  Coloration: Skin is not pale  Findings: No erythema or rash  Neurological:      Mental Status: He is alert and oriented to person, place, and time  Psychiatric:         Behavior: Behavior normal          Thought Content:  Thought content normal          Judgment: Judgment normal          Dylan Friedman PA-C

## 2022-09-07 NOTE — PROGRESS NOTES
Assessment/Plan:    No problem-specific Assessment & Plan notes found for this encounter  Diagnoses and all orders for this visit:    Pain in both feet  -     Diclofenac Sodium (VOLTAREN) 1 %; Apply 2 g topically 4 (four) times a day    Arthritis of both feet  -     Ambulatory Referral to Podiatry  -     Diclofenac Sodium (VOLTAREN) 1 %; Apply 2 g topically 4 (four) times a day      Plan:     -  Patient was counseled and educated on the condition and the diagnosis  The diagnosis, treatment options and prognosis were discussed in detail  - x-rays reviewed and personally interpreted the x-ray findings with patient which is consistent without any acute osseous abnormalities  Midfoot or mild DJD noted  - I recommend conservative treatments such as supportive shoe gear with a wider toe box, over-the-counter inserts such as carbon fiber inserts or custom molded orthotics, shoe gear modification as well as steroid injections  I also recommended continue range-of-motion exercises  Patient agrees with the treatment above  -  patient was unable to tolerate p o  NSAIDs, I will prescribe topical Voltaren gel apply as instructed  If pain is not improved in 4 weeks will Rx midfoot joint injection    - Patient agrees with plan and will continue home exercises   - Return in 4 weeks   - All questions and concerns were addressed, call if any questions      Subjective:      Patient ID: Sai Otto is a 57 y o  male  68-year-old male with no significant past medical history presents with complaint of bilateral generalized midfoot pain and ankle  Reports aching type of pain to bilateral feet  Patient reports he started noticing pain to his foot about 3-4 weeks ago  He has a history of knee arthritis for which she underwent an injection treatment  Patient reports the pain is usually with activity standing and walking for prolonged time  Resting helps    He did see his PCP initially for evaluation, naproxen was prescribed which patient was unable to tolerate and had stomach upset  He has been wearing supportive shoe gear that is about size bigger he prefers that way  No other complaints at this time  Denies nausea vomiting fever chills shortness of breath  The following portions of the patient's history were reviewed and updated as appropriate:   He  has a past medical history of Kidney stone  He   Patient Active Problem List    Diagnosis Date Noted    Seasonal allergic rhinitis due to pollen 04/04/2022    Primary osteoarthritis of right knee 04/02/2021     He  has a past surgical history that includes Kidney stone surgery       Review of Systems   Constitutional: Negative for chills  Respiratory: Negative for shortness of breath  Gastrointestinal: Negative for nausea and vomiting  Musculoskeletal: Positive for arthralgias  Neurological: Negative for dizziness  Psychiatric/Behavioral: Negative for agitation  Objective:      Pulse 63   Ht 5' 4" (1 626 m)   Wt 55 8 kg (123 lb)   SpO2 98%   BMI 21 11 kg/m²          Physical Exam  Vitals reviewed  Cardiovascular:      Rate and Rhythm: Normal rate  Pulses: Normal pulses  Musculoskeletal:         General: Tenderness present  No swelling  Right foot: Decreased range of motion  Tenderness present  No swelling  Left foot: Decreased range of motion  Tenderness present  No swelling  Comments: Generalized foot pain noted upon palpation to bilateral midfoot dorsally  Patient exhibits discomfort with ankle range of motion as well as midfoot range of motion  No pain with 1st MTPJ or lesser MTPJ range of motion  Rectus foot  Normal gait  Skin:     General: Skin is warm and dry  Neurological:      General: No focal deficit present  Mental Status: He is alert     Psychiatric:         Mood and Affect: Mood normal

## 2022-10-05 ENCOUNTER — OFFICE VISIT (OUTPATIENT)
Dept: INTERNAL MEDICINE CLINIC | Facility: CLINIC | Age: 62
End: 2022-10-05
Payer: COMMERCIAL

## 2022-10-05 VITALS
OXYGEN SATURATION: 99 % | DIASTOLIC BLOOD PRESSURE: 64 MMHG | HEART RATE: 62 BPM | HEIGHT: 64 IN | WEIGHT: 122.6 LBS | TEMPERATURE: 96.6 F | BODY MASS INDEX: 20.93 KG/M2 | RESPIRATION RATE: 12 BRPM | SYSTOLIC BLOOD PRESSURE: 136 MMHG

## 2022-10-05 DIAGNOSIS — M77.32 HEEL SPUR, LEFT: ICD-10-CM

## 2022-10-05 DIAGNOSIS — M17.11 PRIMARY OSTEOARTHRITIS OF RIGHT KNEE: ICD-10-CM

## 2022-10-05 DIAGNOSIS — Z23 ENCOUNTER FOR VACCINATION: Primary | ICD-10-CM

## 2022-10-05 DIAGNOSIS — M77.31 HEEL SPUR, RIGHT: ICD-10-CM

## 2022-10-05 PROCEDURE — 99213 OFFICE O/P EST LOW 20 MIN: CPT | Performed by: INTERNAL MEDICINE

## 2022-10-05 PROCEDURE — 90471 IMMUNIZATION ADMIN: CPT | Performed by: INTERNAL MEDICINE

## 2022-10-05 PROCEDURE — 90682 RIV4 VACC RECOMBINANT DNA IM: CPT | Performed by: INTERNAL MEDICINE

## 2022-10-05 NOTE — PROGRESS NOTES
Assessment/Plan:    Problem List Items Addressed This Visit        Musculoskeletal and Integument    Primary osteoarthritis of right knee     Continue the Diclofenac Gel         Heel spur, left     Dr Blackwood heel cushions         Heel spur, right     Dr Antony Villareal heel cushions           Other Visit Diagnoses     Encounter for vaccination    -  Primary    Relevant Orders    influenza vaccine, quadrivalent, recombinant, PF, 0 5 mL, for patients 18 yr+ (FLUBLOK) (Completed)           Diagnoses and all orders for this visit:    Encounter for vaccination  -     influenza vaccine, quadrivalent, recombinant, PF, 0 5 mL, for patients 18 yr+ (FLUBLOK)    Heel spur, right    Heel spur, left    Primary osteoarthritis of right knee      Primary osteoarthritis of right knee  Continue the Diclofenac Gel    Heel spur, right  Dr Blackwood heel cushions    Heel spur, left  Dr Antony Villareal heel cushions        Subjective:      Patient ID: David Parnell is a 57 y o  male  HPI    The following portions of the patient's history were reviewed and updated as appropriate:   He has a past medical history of Kidney stone  ,  does not have any pertinent problems on file  ,   has a past surgical history that includes Kidney stone surgery  ,  family history includes No Known Problems in his father and mother  ,   reports that he has never smoked  He has never used smokeless tobacco  He reports previous alcohol use  He reports that he does not use drugs  ,  has No Known Allergies     Current Outpatient Medications   Medication Sig Dispense Refill    Diclofenac Sodium (VOLTAREN) 1 % Apply 2 g topically 4 (four) times a day 150 g 2    levocetirizine (XYZAL) 5 MG tablet Take 1 tablet (5 mg total) by mouth every evening 30 tablet 2     No current facility-administered medications for this visit  Review of Systems   Constitutional: Negative for chills, fatigue and fever  HENT: Negative      Respiratory: Negative for cough, chest tightness and shortness of breath  Cardiovascular: Negative for chest pain and palpitations  Gastrointestinal: Negative for abdominal pain, constipation, diarrhea, nausea and vomiting  Genitourinary: Negative  Musculoskeletal: Positive for myalgias  Negative for back pain  Skin: Negative  Neurological: Negative  Psychiatric/Behavioral: Negative for dysphoric mood  The patient is not nervous/anxious  Objective:  Vitals:    10/05/22 0943   BP: 136/64   Pulse: 62   Resp: 12   Temp: (!) 96 6 °F (35 9 °C)   SpO2: 99%   Weight: 55 6 kg (122 lb 9 6 oz)   Height: 5' 4" (1 626 m)     Body mass index is 21 04 kg/m²  Physical Exam  Vitals and nursing note reviewed  Constitutional:       Appearance: He is well-developed  HENT:      Head: Normocephalic and atraumatic  Eyes:      Pupils: Pupils are equal, round, and reactive to light  Cardiovascular:      Rate and Rhythm: Normal rate and regular rhythm  Heart sounds: Normal heart sounds  No murmur heard  Pulmonary:      Effort: Pulmonary effort is normal       Breath sounds: Normal breath sounds  No stridor  No rales  Abdominal:      General: Bowel sounds are normal  There is no distension  Palpations: Abdomen is soft  Tenderness: There is no abdominal tenderness  Musculoskeletal:         General: Tenderness present  No deformity  Normal range of motion  Cervical back: Normal range of motion and neck supple  Legs:    Skin:     General: Skin is warm and dry  Neurological:      Mental Status: He is alert and oriented to person, place, and time            PHQ-2/9 Depression Screening

## 2022-10-12 ENCOUNTER — OFFICE VISIT (OUTPATIENT)
Dept: PODIATRY | Facility: CLINIC | Age: 62
End: 2022-10-12
Payer: COMMERCIAL

## 2022-10-12 VITALS — WEIGHT: 122 LBS | HEIGHT: 64 IN | BODY MASS INDEX: 20.83 KG/M2

## 2022-10-12 DIAGNOSIS — M79.671 PAIN IN BOTH FEET: ICD-10-CM

## 2022-10-12 DIAGNOSIS — M79.672 PAIN IN BOTH FEET: ICD-10-CM

## 2022-10-12 DIAGNOSIS — M19.072 ARTHRITIS OF BOTH FEET: Primary | ICD-10-CM

## 2022-10-12 DIAGNOSIS — M19.071 ARTHRITIS OF BOTH FEET: Primary | ICD-10-CM

## 2022-10-12 PROCEDURE — 99212 OFFICE O/P EST SF 10 MIN: CPT | Performed by: STUDENT IN AN ORGANIZED HEALTH CARE EDUCATION/TRAINING PROGRAM

## 2022-10-12 NOTE — PROGRESS NOTES
Assessment/Plan:    No problem-specific Assessment & Plan notes found for this encounter  Diagnoses and all orders for this visit:    Arthritis of both feet  -     Diclofenac Sodium (VOLTAREN) 1 %; Apply 2 g topically 4 (four) times a day    Pain in both feet  -     Diclofenac Sodium (VOLTAREN) 1 %; Apply 2 g topically 4 (four) times a day      Plan:     -  Patient was counseled and educated on the condition and the diagnosis  The diagnosis, treatment options and prognosis were discussed in detail  - patient is doing significantly better with Voltaren gel, recommended over-the-counter orthotics and supportive shoe gear  I recommended him to continue with this treatments  - return as needed    Subjective:      Patient ID: Brittany Fish is a 57 y o  male  77-year-old male with past medical history as below presents for continued treatment of bilateral foot pain secondary to osteoarthritis  Patient reports he has been using Voltaren gel which is helping  He is also bought recommended over-the-counter orthotics that help in his supportive shoe gear  Overall he is pleased with the treatment  No other complaints at this time  The following portions of the patient's history were reviewed and updated as appropriate:   He  has a past medical history of Kidney stone  He   Patient Active Problem List    Diagnosis Date Noted   • Heel spur, left 10/05/2022   • Heel spur, right 10/05/2022   • Seasonal allergic rhinitis due to pollen 04/04/2022   • Primary osteoarthritis of right knee 04/02/2021     He  has a past surgical history that includes Kidney stone surgery       Review of Systems   Constitutional: Negative for chills  Respiratory: Negative for shortness of breath  Gastrointestinal: Negative for vomiting  Musculoskeletal: Positive for arthralgias  Skin: Negative for color change  Neurological: Negative for dizziness  Psychiatric/Behavioral: Negative for agitation  Objective:      Ht 5' 4" (1 626 m)   Wt 55 3 kg (122 lb)   BMI 20 94 kg/m²          Physical Exam  Vitals reviewed  Cardiovascular:      Rate and Rhythm: Normal rate  Pulses: Normal pulses  Pulmonary:      Effort: Pulmonary effort is normal    Musculoskeletal:      Comments: No pain with palpation noted bilateral midfoot dorsally  No discomfort with ankle range of motion as well as midfoot range of motion  No pain with 1st MTPJ or lesser MTPJ range of motion  Rectus foot  Normal gait  Skin:     General: Skin is warm and dry  Neurological:      General: No focal deficit present  Mental Status: He is alert     Psychiatric:         Mood and Affect: Mood normal

## 2022-12-21 ENCOUNTER — OFFICE VISIT (OUTPATIENT)
Dept: INTERNAL MEDICINE CLINIC | Facility: CLINIC | Age: 62
End: 2022-12-21

## 2022-12-21 VITALS
HEART RATE: 80 BPM | WEIGHT: 122 LBS | BODY MASS INDEX: 20.83 KG/M2 | TEMPERATURE: 96.2 F | DIASTOLIC BLOOD PRESSURE: 62 MMHG | HEIGHT: 64 IN | SYSTOLIC BLOOD PRESSURE: 124 MMHG

## 2022-12-21 DIAGNOSIS — Z01.818 PRE-OP EXAM: Primary | ICD-10-CM

## 2022-12-21 DIAGNOSIS — Z12.5 SCREENING PSA (PROSTATE SPECIFIC ANTIGEN): ICD-10-CM

## 2022-12-21 DIAGNOSIS — Z13.0 SCREENING, ANEMIA, DEFICIENCY, IRON: ICD-10-CM

## 2022-12-21 DIAGNOSIS — R10.13 DYSPEPSIA: ICD-10-CM

## 2022-12-21 DIAGNOSIS — Z13.1 SCREENING FOR DIABETES MELLITUS: ICD-10-CM

## 2022-12-21 DIAGNOSIS — Z13.220 SCREENING FOR HYPERCHOLESTEROLEMIA: ICD-10-CM

## 2022-12-21 RX ORDER — FAMOTIDINE 40 MG/1
40 TABLET, FILM COATED ORAL 2 TIMES DAILY
Qty: 180 TABLET | Refills: 0 | Status: SHIPPED | OUTPATIENT
Start: 2022-12-21 | End: 2023-03-21

## 2022-12-21 NOTE — PROGRESS NOTES
Name: Darling Navas      : 1960      MRN: [id number]  Encounter Provider: Hadley Hendricks DO  Encounter Date: 2022   Encounter department: [address] [place] [address]  Pre-op exam  Comments:  Cleared for sugery and Low Cardiac Risk    2  Dyspepsia  Comments:  Famotidine 40 mg BID x 3 months  Orders:  -     famotidine (PEPCID) 40 MG tablet; Take 1 tablet (40 mg total) by mouth 2 (two) times a day    3  Screening for diabetes mellitus  -     Comprehensive metabolic panel; Future; Expected date: 2023    4  Screening for hypercholesterolemia  -     Comprehensive metabolic panel; Future; Expected date: 2023  -     Lipid Panel with Direct LDL reflex; Future; Expected date: 2023    5  Screening, anemia, deficiency, iron  -     CBC and differential; Future; Expected date: 2023    6  Screening PSA (prostate specific antigen)  -     PSA, Total Screen; Future; Expected date: 2023         Subjective     The patient will have right cataract surgery with Dr Chioma Carlin on 23  The patient notes some mild GERD symptoms  Review of Systems   Constitutional: Negative for chills, fatigue and fever  HENT: Negative  Respiratory: Negative for cough, chest tightness and shortness of breath  Cardiovascular: Negative for chest pain and palpitations  Gastrointestinal: Negative for abdominal pain, constipation, diarrhea, nausea and vomiting  Genitourinary: Negative  Musculoskeletal: Negative for back pain and myalgias  Skin: Negative  Neurological: Negative  Psychiatric/Behavioral: Negative for dysphoric mood  The patient is not nervous/anxious          Past Medical History:   Diagnosis Date   • Kidney stone      Past Surgical History:   Procedure Laterality Date   • KIDNEY STONE SURGERY       Family History   Problem Relation Age of Onset   • No Known Problems Mother    • No Known Problems Father      Social History Socioeconomic History   • Marital status: /Civil Union     Spouse name: None   • Number of children: None   • Years of education: None   • Highest education level: None   Occupational History   • Occupation: employed   Tobacco Use   • Smoking status: Never   • Smokeless tobacco: Never   Vaping Use   • Vaping Use: Never used   Substance and Sexual Activity   • Alcohol use: Not Currently   • Drug use: Never   • Sexual activity: Not Currently   Other Topics Concern   • None   Social History Narrative        employed     Social Determinants of Health     Financial Resource Strain: Not on file   Food Insecurity: Not on file   Transportation Needs: Not on file   Physical Activity: Not on file   Stress: Not on file   Social Connections: Not on file   Intimate Partner Violence: Not on file   Housing Stability: Not on file     Current Outpatient Medications on File Prior to Visit   Medication Sig   • Diclofenac Sodium (VOLTAREN) 1 % Apply 2 g topically 4 (four) times a day   • [DISCONTINUED] Diclofenac Sodium (VOLTAREN) 1 % Apply 2 g topically 4 (four) times a day   • [DISCONTINUED] levocetirizine (XYZAL) 5 MG tablet Take 1 tablet (5 mg total) by mouth every evening (Patient not taking: Reported on 12/21/2022)     No Known Allergies  Immunization History   Administered Date(s) Administered   • COVID-19 MODERNA VACC 0 5 ML IM 05/20/2021, 06/17/2021, 02/04/2022   • INFLUENZA 10/23/2020, 10/05/2022   • Influenza, recombinant, quadrivalent,injectable, preservative free 10/22/2021, 10/05/2022   • Tdap 03/11/2009, 01/14/2022       Objective     /62 (BP Location: Left arm, Patient Position: Sitting, Cuff Size: Standard)   Pulse 80   Temp (!) 96 2 °F (35 7 °C)   Ht 5' 4" (1 626 m)   Wt 55 3 kg (122 lb)   BMI 20 94 kg/m²     Physical Exam  Vitals and nursing note reviewed  Constitutional:       Appearance: He is well-developed  HENT:      Head: Normocephalic and atraumatic     Eyes:      Pupils: Pupils are equal, round, and reactive to light  Cardiovascular:      Rate and Rhythm: Normal rate and regular rhythm  Heart sounds: Normal heart sounds  No murmur heard  Pulmonary:      Effort: Pulmonary effort is normal       Breath sounds: Normal breath sounds  No stridor  No rales  Abdominal:      General: Bowel sounds are normal  There is no distension  Palpations: Abdomen is soft  Tenderness: There is no abdominal tenderness  Musculoskeletal:         General: No deformity  Normal range of motion  Cervical back: Normal range of motion and neck supple  Skin:     General: Skin is warm and dry  Neurological:      Mental Status: He is alert and oriented to person, place, and time         Angelo Da Silva,

## 2023-01-14 ENCOUNTER — OFFICE VISIT (OUTPATIENT)
Dept: URGENT CARE | Facility: CLINIC | Age: 63
End: 2023-01-14

## 2023-01-14 ENCOUNTER — APPOINTMENT (OUTPATIENT)
Dept: RADIOLOGY | Facility: CLINIC | Age: 63
End: 2023-01-14

## 2023-01-14 VITALS
SYSTOLIC BLOOD PRESSURE: 153 MMHG | OXYGEN SATURATION: 100 % | DIASTOLIC BLOOD PRESSURE: 75 MMHG | HEART RATE: 83 BPM | TEMPERATURE: 97.7 F | RESPIRATION RATE: 18 BRPM | WEIGHT: 122 LBS | HEIGHT: 64 IN | BODY MASS INDEX: 20.83 KG/M2

## 2023-01-14 DIAGNOSIS — S69.92XA INJURY OF LEFT HAND, INITIAL ENCOUNTER: ICD-10-CM

## 2023-01-14 DIAGNOSIS — S69.92XA INJURY OF LEFT HAND, INITIAL ENCOUNTER: Primary | ICD-10-CM

## 2023-01-15 NOTE — PROGRESS NOTES
[facility]        NAME: Jamarcus Saldivar is a 70 y o  male  : 1960    MRN: [id number]  DATE: January 15, 2023  TIME: 9:00 AM    Assessment and Plan   Injury of left hand, initial encounter [S69 [id number]]  1  Injury of left hand, initial encounter  XR hand 3+ vw left    XR wrist 3+ vw left        xrays show no acute osseous abnormality per my read  Ace wrap applied    Patient Instructions     Patient Instructions   Rest, ice, elevate  Wear ace wrap as directed  Tylenol as needed for pain  If you develop any increased pain, swelling, numbness, tingling, or any new or concerning symptoms please return or proceed to ER  Follow up with pcp in 3-5 days  Chief Complaint     Chief Complaint   Patient presents with   • Hand Pain     Patient c/o left hand pain that started after catching falling ladder a week ago  History of Present Illness       Hand Pain   The incident occurred more than 1 week ago  The incident occurred at work  Injury mechanism: states that he was climbing down a ladder and when he got the bottom the ladder started to fall and he caught it with his left hand  has been having left hand and wrist pain since  The pain is present in the left hand and left wrist  The quality of the pain is described as aching  The pain does not radiate  The pain is moderate  The pain has been constant since the incident  Pertinent negatives include no chest pain, muscle weakness or numbness  The symptoms are aggravated by palpation and movement  He has tried acetaminophen for the symptoms  The treatment provided mild relief  Review of Systems   Review of Systems   Constitutional: Negative for chills, diaphoresis, fatigue and fever  Respiratory: Negative for cough, chest tightness, shortness of breath, wheezing and stridor  Cardiovascular: Negative for chest pain and palpitations  Musculoskeletal: Positive for arthralgias   Negative for back pain, gait problem, joint swelling, myalgias, neck pain and neck stiffness  Skin: Negative for rash and wound  Neurological: Negative for weakness and numbness  Current Medications       Current Outpatient Medications:   •  Diclofenac Sodium (VOLTAREN) 1 %, Apply 2 g topically 4 (four) times a day, Disp: 150 g, Rfl: 2  •  famotidine (PEPCID) 40 MG tablet, Take 1 tablet (40 mg total) by mouth 2 (two) times a day, Disp: 180 tablet, Rfl: 0    Current Allergies     Allergies as of 01/14/2023   • (No Known Allergies)            The following portions of the patient's history were reviewed and updated as appropriate: allergies, current medications, past family history, past medical history, past social history, past surgical history and problem list      Past Medical History:   Diagnosis Date   • Kidney stone        Past Surgical History:   Procedure Laterality Date   • KIDNEY STONE SURGERY         Family History   Problem Relation Age of Onset   • No Known Problems Mother    • No Known Problems Father          Medications have been verified  Objective   /75   Pulse 83   Temp 97 7 °F (36 5 °C) (Temporal)   Resp 18   Ht 5' 4" (1 626 m)   Wt 55 3 kg (122 lb)   SpO2 100%   BMI 20 94 kg/m²   No LMP for male patient  Physical Exam     Physical Exam  Constitutional:       General: He is not in acute distress  Appearance: Normal appearance  He is not diaphoretic  HENT:      Head: Normocephalic and atraumatic  Cardiovascular:      Rate and Rhythm: Normal rate and regular rhythm  Pulses: Normal pulses  Radial pulses are 2+ on the right side and 2+ on the left side  Heart sounds: Normal heart sounds, S1 normal and S2 normal    Pulmonary:      Effort: Pulmonary effort is normal       Breath sounds: Normal breath sounds and air entry  Musculoskeletal:      Left wrist: Tenderness and bony tenderness present  No swelling, deformity or snuff box tenderness  Normal range of motion  Normal pulse  Left hand: Swelling (mild swelling over dorsum of right hand ), tenderness and bony tenderness present  No deformity  Normal range of motion  Normal strength  Normal sensation  There is no disruption of two-point discrimination  Normal capillary refill  Normal pulse  Skin:     General: Skin is warm and dry  Capillary Refill: Capillary refill takes less than 2 seconds  Neurological:      Mental Status: He is alert

## 2023-06-14 ENCOUNTER — OFFICE VISIT (OUTPATIENT)
Dept: URGENT CARE | Facility: CLINIC | Age: 63
End: 2023-06-14
Payer: MEDICARE

## 2023-06-14 VITALS
TEMPERATURE: 97.5 F | RESPIRATION RATE: 18 BRPM | WEIGHT: 128 LBS | OXYGEN SATURATION: 100 % | BODY MASS INDEX: 21.85 KG/M2 | HEIGHT: 64 IN | HEART RATE: 64 BPM | DIASTOLIC BLOOD PRESSURE: 64 MMHG | SYSTOLIC BLOOD PRESSURE: 136 MMHG

## 2023-06-14 DIAGNOSIS — T14.8XXA PUNCTURE WOUND: Primary | ICD-10-CM

## 2023-06-14 PROCEDURE — G0463 HOSPITAL OUTPT CLINIC VISIT: HCPCS | Performed by: PHYSICIAN ASSISTANT

## 2023-06-14 PROCEDURE — 99213 OFFICE O/P EST LOW 20 MIN: CPT | Performed by: PHYSICIAN ASSISTANT

## 2023-06-14 RX ORDER — CEPHALEXIN 500 MG/1
500 CAPSULE ORAL EVERY 12 HOURS SCHEDULED
Qty: 10 CAPSULE | Refills: 0 | Status: SHIPPED | OUTPATIENT
Start: 2023-06-14 | End: 2023-06-19

## 2023-06-14 NOTE — PROGRESS NOTES
[facility]      NAME: Lyndsey Cotton is a 70 y o  male  : 1960    MRN: [id number]  DATE: 2023  TIME: 11:56 AM    Assessment and Plan   Puncture wound [T14  8XXA]  1  Puncture wound  cephalexin (KEFLEX) 500 mg capsule          Patient Instructions   Alternate tylenol/ibu for pain  Keflex called in in case any signs of infection such as increased redness, purulent drainage or increased pain  If not, no need to take  Otherwise follow up with PCP in 7 days  Patient agreeable to plan  To present to the ER if symptoms worsen  Chief Complaint     Chief Complaint   Patient presents with   • Foreign Body in Skin     On Saturday was working in the garden an thinks he got a thorn in his right thumb  History of Present Illness   José Antonio Payne presents to the clinic c/o    Hand Pain   The incident occurred 3 to 5 days ago  The incident occurred at home  Injury mechanism: possible thorn in right thumb (patient right handed) The pain is mild  Pertinent negatives include no chest pain or tingling  Treatments tried: washed soap and water  The treatment provided no relief  tdap UTD  Review of Systems   Review of Systems   Constitutional: Negative for chills, diaphoresis, fatigue and fever  HENT: Negative for congestion, ear discharge, ear pain and facial swelling  Eyes: Negative for photophobia, pain, discharge, redness, itching and visual disturbance  Respiratory: Negative for apnea, cough, chest tightness, shortness of breath and wheezing  Cardiovascular: Negative for chest pain and palpitations  Gastrointestinal: Negative for abdominal pain  Skin: Positive for wound  Negative for color change and rash  Neurological: Negative for dizziness, tingling and headaches  Hematological: Negative for adenopathy           Current Medications     Long-Term Medications   Medication Sig Dispense Refill   • Diclofenac Sodium (VOLTAREN) 1 % Apply 2 g topically 4 "(four) times a day (Patient not taking: Reported on 6/14/2023) 150 g 2   • famotidine (PEPCID) 40 MG tablet Take 1 tablet (40 mg total) by mouth 2 (two) times a day 180 tablet 0       Current Allergies     Allergies as of 06/14/2023   • (No Known Allergies)            The following portions of the patient's history were reviewed and updated as appropriate: allergies, current medications, past family history, past medical history, past social history, past surgical history and problem list   Past Medical History:   Diagnosis Date   • Kidney stone      Past Surgical History:   Procedure Laterality Date   • KIDNEY STONE SURGERY       Social History     Socioeconomic History   • Marital status: /Civil Union     Spouse name: Not on file   • Number of children: Not on file   • Years of education: Not on file   • Highest education level: Not on file   Occupational History   • Occupation: employed   Tobacco Use   • Smoking status: Never   • Smokeless tobacco: Never   Vaping Use   • Vaping Use: Never used   Substance and Sexual Activity   • Alcohol use: Not Currently   • Drug use: Never   • Sexual activity: Not Currently   Other Topics Concern   • Not on file   Social History Narrative        employed     Social Determinants of Health     Financial Resource Strain: Not on file   Food Insecurity: Not on file   Transportation Needs: Not on file   Physical Activity: Not on file   Stress: Not on file   Social Connections: Not on file   Intimate Partner Violence: Not on file   Housing Stability: Not on file       Objective   /64   Pulse 64   Temp 97 5 °F (36 4 °C)   Resp 18   Ht 5' 4\" (1 626 m)   Wt 58 1 kg (128 lb)   SpO2 100%   BMI 21 97 kg/m²      Physical Exam     Physical Exam  Vitals and nursing note reviewed  Constitutional:       General: He is not in acute distress  Appearance: He is well-developed  He is not diaphoretic  HENT:      Head: Normocephalic and atraumatic        Right Ear: " External ear normal       Left Ear: External ear normal       Nose: Nose normal       Mouth/Throat:      Mouth: Mucous membranes are moist       Pharynx: No oropharyngeal exudate or posterior oropharyngeal erythema  Eyes:      General: No scleral icterus  Right eye: No discharge  Left eye: No discharge  Conjunctiva/sclera: Conjunctivae normal    Cardiovascular:      Rate and Rhythm: Normal rate and regular rhythm  Heart sounds: Normal heart sounds  No murmur heard  No friction rub  No gallop  Pulmonary:      Effort: Pulmonary effort is normal  No respiratory distress  Breath sounds: Normal breath sounds  No decreased breath sounds, wheezing, rhonchi or rales  Musculoskeletal:        Hands:    Skin:     General: Skin is warm and dry  Coloration: Skin is not pale  Findings: No erythema or rash  Neurological:      Mental Status: He is alert and oriented to person, place, and time  Psychiatric:         Behavior: Behavior normal          Thought Content:  Thought content normal          Judgment: Judgment normal          Lis Atwood PA-C

## 2023-07-14 ENCOUNTER — OFFICE VISIT (OUTPATIENT)
Dept: INTERNAL MEDICINE CLINIC | Facility: CLINIC | Age: 63
End: 2023-07-14
Payer: MEDICARE

## 2023-07-14 VITALS
RESPIRATION RATE: 12 BRPM | HEART RATE: 88 BPM | WEIGHT: 127 LBS | TEMPERATURE: 98.5 F | BODY MASS INDEX: 21.68 KG/M2 | OXYGEN SATURATION: 97 % | SYSTOLIC BLOOD PRESSURE: 106 MMHG | DIASTOLIC BLOOD PRESSURE: 54 MMHG | HEIGHT: 64 IN

## 2023-07-14 DIAGNOSIS — M77.31 HEEL SPUR, RIGHT: ICD-10-CM

## 2023-07-14 DIAGNOSIS — M79.671 PAIN IN BOTH FEET: ICD-10-CM

## 2023-07-14 DIAGNOSIS — M19.071 ARTHRITIS OF BOTH FEET: ICD-10-CM

## 2023-07-14 DIAGNOSIS — M77.32 HEEL SPUR, LEFT: ICD-10-CM

## 2023-07-14 DIAGNOSIS — Z00.00 MEDICARE ANNUAL WELLNESS VISIT, SUBSEQUENT: Primary | ICD-10-CM

## 2023-07-14 DIAGNOSIS — Z12.5 SCREENING PSA (PROSTATE SPECIFIC ANTIGEN): ICD-10-CM

## 2023-07-14 DIAGNOSIS — Z13.0 SCREENING, ANEMIA, DEFICIENCY, IRON: ICD-10-CM

## 2023-07-14 DIAGNOSIS — M17.11 PRIMARY OSTEOARTHRITIS OF RIGHT KNEE: ICD-10-CM

## 2023-07-14 DIAGNOSIS — M79.672 PAIN IN BOTH FEET: ICD-10-CM

## 2023-07-14 DIAGNOSIS — Z13.1 SCREENING FOR DIABETES MELLITUS: ICD-10-CM

## 2023-07-14 DIAGNOSIS — M19.072 ARTHRITIS OF BOTH FEET: ICD-10-CM

## 2023-07-14 DIAGNOSIS — Z13.220 SCREENING FOR HYPERCHOLESTEROLEMIA: ICD-10-CM

## 2023-07-14 PROCEDURE — G0402 INITIAL PREVENTIVE EXAM: HCPCS | Performed by: INTERNAL MEDICINE

## 2023-07-14 PROCEDURE — 99213 OFFICE O/P EST LOW 20 MIN: CPT | Performed by: INTERNAL MEDICINE

## 2023-07-14 NOTE — PROGRESS NOTES
Assessment and Plan:     Problem List Items Addressed This Visit        Musculoskeletal and Integument    Primary osteoarthritis of right knee    Heel spur, left    Heel spur, right   Other Visit Diagnoses     Medicare annual wellness visit, subsequent    -  Primary    Arthritis of both feet        Relevant Medications    Diclofenac Sodium (VOLTAREN) 1 %    Pain in both feet        Relevant Medications    Diclofenac Sodium (VOLTAREN) 1 %    Screening for diabetes mellitus        Relevant Orders    Comprehensive metabolic panel    Screening for hypercholesterolemia        Relevant Orders    Lipid Panel with Direct LDL reflex    Screening, anemia, deficiency, iron        Relevant Orders    CBC and differential    Screening PSA (prostate specific antigen)        Relevant Orders    PSA, Total Screen          Depression Screening and Follow-up Plan: Patient was screened for depression during today's encounter. They screened negative with a PHQ-2 score of 0. Preventive health issues were discussed with patient, and age appropriate screening tests were ordered as noted in patient's After Visit Summary. Personalized health advice and appropriate referrals for health education or preventive services given if needed, as noted in patient's After Visit Summary. History of Present Illness:     Patient presents for a Medicare Wellness Visit    No concerns at this time. His knee is doing well and he has no issues with his feet. Patient Care Team:  Layla Roca DO as PCP - General (Internal Medicine)  Shana Bell DO as PCP - [address] [place] [address] [facility]  Shana Bell DO as PCP - [facility]-[employer] ([facility])     Review of Systems:     Review of Systems   Constitutional: Negative for chills, fatigue and fever. HENT: Negative. Respiratory: Negative for cough, chest tightness and shortness of breath. Cardiovascular: Negative for chest pain and palpitations.    Gastrointestinal: Negative for abdominal pain, constipation, diarrhea, nausea and vomiting. Genitourinary: Negative. Musculoskeletal: Negative for back pain and myalgias. Skin: Negative. Neurological: Negative. Psychiatric/Behavioral: Negative for dysphoric mood. The patient is not nervous/anxious. Problem List:     Patient Active Problem List   Diagnosis   • Primary osteoarthritis of right knee   • Seasonal allergic rhinitis due to pollen   • Heel spur, left   • Heel spur, right      Past Medical and Surgical History:     Past Medical History:   Diagnosis Date   • Kidney stone      Past Surgical History:   Procedure Laterality Date   • CATARACT EXTRACTION Right    • KIDNEY STONE SURGERY        Family History:     Family History   Problem Relation Age of Onset   • No Known Problems Mother    • No Known Problems Father       Social History:     Social History     Socioeconomic History   • Marital status: /Civil Union     Spouse name: None   • Number of children: None   • Years of education: None   • Highest education level: None   Occupational History   • Occupation: employed   Tobacco Use   • Smoking status: Never   • Smokeless tobacco: Never   Vaping Use   • Vaping Use: Never used   Substance and Sexual Activity   • Alcohol use: Not Currently   • Drug use: Never   • Sexual activity: Not Currently   Other Topics Concern   • None   Social History Narrative        employed     Social Determinants of Health     Financial Resource Strain: Low Risk  (7/14/2023)    Overall Financial Resource Strain (CARDIA)    • Difficulty of Paying Living Expenses: Not very hard   Food Insecurity: Not on file   Transportation Needs: No Transportation Needs (7/14/2023)    PRAPARE - Transportation    • Lack of Transportation (Medical): No    • Lack of Transportation (Non-Medical):  No   Physical Activity: Not on file   Stress: Not on file   Social Connections: Not on file   Intimate Partner Violence: Not on file   Housing Stability: Not on file Medications and Allergies:     Current Outpatient Medications   Medication Sig Dispense Refill   • Diclofenac Sodium (VOLTAREN) 1 % Apply 2 g topically 4 (four) times a day 150 g 2     No current facility-administered medications for this visit. No Known Allergies   Immunizations:     Immunization History   Administered Date(s) Administered   • COVID-19 MODERNA VACC 0.5 ML IM 05/20/2021, 06/17/2021, 02/04/2022   • INFLUENZA 10/23/2020, 10/05/2022   • Influenza, recombinant, quadrivalent,injectable, preservative free 10/22/2021, 10/05/2022   • Tdap 03/11/2009, 01/14/2022      Health Maintenance:         Topic Date Due   • HIV Screening  Never done   • Colorectal Cancer Screening  04/20/2025   • Hepatitis C Screening  Completed         Topic Date Due   • COVID-19 Vaccine (4 - Moderna series) 04/01/2022   • Pneumococcal Vaccine: 65+ Years (1 - PCV) Never done   • Influenza Vaccine (1) 09/01/2023      Medicare Screening Tests and Risk Assessments:     José Antonio is here for his Initial Wellness visit. Health Risk Assessment:   Patient rates overall health as excellent. Patient feels that their physical health rating is slightly better. Patient is satisfied with their life. Eyesight was rated as same. Hearing was rated as same. Patient feels that their emotional and mental health rating is slightly better. Patients states they are sometimes angry. Patient states they are sometimes unusually tired/fatigued. Pain experienced in the last 7 days has been none. Patient states that he has experienced no weight loss or gain in last 6 months. Depression Screening:   PHQ-2 Score: 0      Fall Risk Screening: In the past year, patient has experienced: no history of falling in past year      Home Safety:  Patient has trouble with stairs inside or outside of their home. Patient has working smoke alarms and has working carbon monoxide detector. Home safety hazards include: none.      Nutrition:   Current diet is Regular. Medications:   Patient is not currently taking any over-the-counter supplements. Patient is able to manage medications. Activities of Daily Living (ADLs)/Instrumental Activities of Daily Living (IADLs):   Walk and transfer into and out of bed and chair?: Yes  Dress and groom yourself?: Yes    Bathe or shower yourself?: Yes    Feed yourself? Yes  Do your laundry/housekeeping?: Yes  Manage your money, pay your bills and track your expenses?: Yes  Make your own meals?: Yes    Do your own shopping?: Yes    Previous Hospitalizations:   Any hospitalizations or ED visits within the last 12 months?: No      Advance Care Planning:   Living will: No    Durable POA for healthcare: No    Advanced directive: No    Advanced directive counseling given: Yes    Five wishes given: Yes    Patient declined ACP directive: No    End of Life Decisions reviewed with patient: Yes    Provider agrees with end of life decisions: Yes      Cognitive Screening:   Provider or family/friend/caregiver concerned regarding cognition?: No    PREVENTIVE SCREENINGS      Cardiovascular Screening:    General: Screening Current    Due for: Lipid Panel      Diabetes Screening:       Due for: Blood Glucose      Colorectal Cancer Screening:     General: Screening Current      Prostate Cancer Screening:      Due for: PSA      Osteoporosis Screening:    General: Screening Not Indicated      Abdominal Aortic Aneurysm (AAA) Screening:    Risk factors include: age between 70-75 yo        General: Screening Not Indicated      Lung Cancer Screening:     General: Screening Not Indicated      Hepatitis C Screening:    General: Screening Current    Screening, Brief Intervention, and Referral to Treatment (SBIRT)    Screening  Typical number of drinks in a day: 0  Typical number of drinks in a week: 0  Interpretation: Low risk drinking behavior.     Single Item Drug Screening:  How often have you used an illegal drug (including marijuana) or a prescription medication for non-medical reasons in the past year? never    Single Item Drug Screen Score: 0  Interpretation: Negative screen for possible drug use disorder    No results found. Physical Exam:     /54   Pulse 88   Temp 98.5 °F (36.9 °C)   Resp 12   Ht 5' 4" (1.626 m)   Wt 57.6 kg (127 lb)   SpO2 97%   BMI 21.80 kg/m²     Physical Exam  Vitals and nursing note reviewed. Constitutional:       General: He is not in acute distress. Appearance: He is well-developed. HENT:      Head: Normocephalic and atraumatic. Eyes:      Conjunctiva/sclera: Conjunctivae normal.   Cardiovascular:      Rate and Rhythm: Normal rate and regular rhythm. Heart sounds: No murmur heard. Pulmonary:      Effort: Pulmonary effort is normal. No respiratory distress. Breath sounds: Normal breath sounds. Abdominal:      Palpations: Abdomen is soft. Tenderness: There is no abdominal tenderness. Musculoskeletal:         General: No swelling. Cervical back: Neck supple. Skin:     General: Skin is warm and dry. Capillary Refill: Capillary refill takes less than 2 seconds. Neurological:      Mental Status: He is alert.    Psychiatric:         Mood and Affect: Mood normal.          Dianna Marino, DO

## 2023-07-14 NOTE — PATIENT INSTRUCTIONS
Medicare Preventive Visit Patient Instructions  Thank you for completing your Welcome to Medicare Visit or Medicare Annual Wellness Visit today. Your next wellness visit will be due in one year (7/14/2024). The screening/preventive services that you may require over the next 5-10 years are detailed below. Some tests may not apply to you based off risk factors and/or age. Screening tests ordered at today's visit but not completed yet may show as past due. Also, please note that scanned in results may not display below. Preventive Screenings:  Service Recommendations Previous Testing/Comments   Colorectal Cancer Screening  · Colonoscopy    · Fecal Occult Blood Test (FOBT)/Fecal Immunochemical Test (FIT)  · Fecal DNA/Cologuard Test  · Flexible Sigmoidoscopy Age: 43-73 years old   Colonoscopy: every 10 years (May be performed more frequently if at higher risk)  OR  FOBT/FIT: every 1 year  OR  Cologuard: every 3 years  OR  Sigmoidoscopy: every 5 years  Screening may be recommended earlier than age 39 if at higher risk for colorectal cancer. Also, an individualized decision between you and your healthcare provider will decide whether screening between the ages of 77-80 would be appropriate.  Colonoscopy: 04/20/2022  FOBT/FIT: Not on file  Cologuard: 04/20/2022  Sigmoidoscopy: Not on file    Screening Current     Prostate Cancer Screening Individualized decision between patient and health care provider in men between ages of 53-66   Medicare will cover every 12 months beginning on the day after your 50th birthday PSA: 0.6 ng/mL           Hepatitis C Screening Once for adults born between 1945 and 1965  More frequently in patients at high risk for Hepatitis C Hep C Antibody: 11/04/2021    Screening Current   Diabetes Screening 1-2 times per year if you're at risk for diabetes or have pre-diabetes Fasting glucose: 102 mg/dL (11/4/2021)  A1C: No results in last 5 years (No results in last 5 years)      Cholesterol Screening Once every 5 years if you don't have a lipid disorder. May order more often based on risk factors. Lipid panel: 11/04/2021  Screening Current      Other Preventive Screenings Covered by Medicare:  1. Abdominal Aortic Aneurysm (AAA) Screening: covered once if your at risk. You're considered to be at risk if you have a family history of AAA or a male between the age of 70-76 who smoking at least 100 cigarettes in your lifetime. 2. Lung Cancer Screening: covers low dose CT scan once per year if you meet all of the following conditions: (1) Age 48-67; (2) No signs or symptoms of lung cancer; (3) Current smoker or have quit smoking within the last 15 years; (4) You have a tobacco smoking history of at least 20 pack years (packs per day x number of years you smoked); (5) You get a written order from a healthcare provider. 3. Glaucoma Screening: covered annually if you're considered high risk: (1) You have diabetes OR (2) Family history of glaucoma OR (3)  aged 48 and older OR (3)  American aged 72 and older  3. Osteoporosis Screening: covered every 2 years if you meet one of the following conditions: (1) Have a vertebral abnormality; (2) On glucocorticoid therapy for more than 3 months; (3) Have primary hyperparathyroidism; (4) On osteoporosis medications and need to assess response to drug therapy. 5. HIV Screening: covered annually if you're between the age of 14-79. Also covered annually if you are younger than 13 and older than 72 with risk factors for HIV infection. For pregnant patients, it is covered up to 3 times per pregnancy.     Immunizations:  Immunization Recommendations   Influenza Vaccine Annual influenza vaccination during flu season is recommended for all persons aged >= 6 months who do not have contraindications   Pneumococcal Vaccine   * Pneumococcal conjugate vaccine = PCV13 (Prevnar 13), PCV15 (Vaxneuvance), PCV20 (Prevnar 20)  * Pneumococcal polysaccharide vaccine = PPSV23 (Pneumovax) Adults 2364 years old: 1-3 doses may be recommended based on certain risk factors  Adults 72 years old: 1-2 doses may be recommended based off what pneumonia vaccine you previously received   Hepatitis B Vaccine 3 dose series if at intermediate or high risk (ex: diabetes, end stage renal disease, liver disease)   Tetanus (Td) Vaccine - COST NOT COVERED BY MEDICARE PART B Following completion of primary series, a booster dose should be given every 10 years to maintain immunity against tetanus. Td may also be given as tetanus wound prophylaxis. Tdap Vaccine - COST NOT COVERED BY MEDICARE PART B Recommended at least once for all adults. For pregnant patients, recommended with each pregnancy. Shingles Vaccine (Shingrix) - COST NOT COVERED BY MEDICARE PART B  2 shot series recommended in those aged 48 and above     Health Maintenance Due:      Topic Date Due   • HIV Screening  Never done   • Colorectal Cancer Screening  04/20/2025   • Hepatitis C Screening  Completed     Immunizations Due:      Topic Date Due   • COVID-19 Vaccine (4 - Moderna series) 04/01/2022   • Pneumococcal Vaccine: 65+ Years (1 - PCV) Never done   • Influenza Vaccine (1) 09/01/2023     Advance Directives   What are advance directives? Advance directives are legal documents that state your wishes and plans for medical care. These plans are made ahead of time in case you lose your ability to make decisions for yourself. Advance directives can apply to any medical decision, such as the treatments you want, and if you want to donate organs. What are the types of advance directives? There are many types of advance directives, and each state has rules about how to use them. You may choose a combination of any of the following:  · Living will: This is a written record of the treatment you want. You can also choose which treatments you do not want, which to limit, and which to stop at a certain time.  This includes surgery, medicine, IV fluid, and tube feedings. · Durable power of  for healthcare [facility]): This is a written record that states who you want to make healthcare choices for you when you are unable to make them for yourself. This person, called a proxy, is usually a family member or a friend. You may choose more than 1 proxy. · Do not resuscitate (DNR) order:  A DNR order is used in case your heart stops beating or you stop breathing. It is a request not to have certain forms of treatment, such as CPR. A DNR order may be included in other types of advance directives. · Medical directive: This covers the care that you want if you are in a coma, near death, or unable to make decisions for yourself. You can list the treatments you want for each condition. Treatment may include pain medicine, surgery, blood transfusions, dialysis, IV or tube feedings, and a ventilator (breathing machine). · Values history: This document has questions about your views, beliefs, and how you feel and think about life. This information can help others choose the care that you would choose. Why are advance directives important? An advance directive helps you control your care. Although spoken wishes may be used, it is better to have your wishes written down. Spoken wishes can be misunderstood, or not followed. Treatments may be given even if you do not want them. An advance directive may make it easier for your family to make difficult choices about your care. © Copyright [employer] 2018 Information is for End User's use only and may not be sold, redistributed or otherwise used for commercial purposes.  All illustrations and images included in CareNotes® are the copyrighted property of A.D.A.M., Inc. or  [employer]

## 2023-07-20 ENCOUNTER — LAB (OUTPATIENT)
Dept: LAB | Facility: CLINIC | Age: 63
End: 2023-07-20
Payer: MEDICARE

## 2023-07-20 DIAGNOSIS — Z13.1 SCREENING FOR DIABETES MELLITUS: ICD-10-CM

## 2023-07-20 DIAGNOSIS — Z13.0 SCREENING, ANEMIA, DEFICIENCY, IRON: ICD-10-CM

## 2023-07-20 DIAGNOSIS — Z13.220 SCREENING FOR HYPERCHOLESTEROLEMIA: ICD-10-CM

## 2023-07-20 DIAGNOSIS — Z12.5 SCREENING PSA (PROSTATE SPECIFIC ANTIGEN): ICD-10-CM

## 2023-07-20 LAB
ALBUMIN SERPL BCP-MCNC: 3.9 G/DL (ref 3.5–5)
ALP SERPL-CCNC: 61 U/L (ref 46–116)
ALT SERPL W P-5'-P-CCNC: 29 U/L (ref 12–78)
ANION GAP SERPL CALCULATED.3IONS-SCNC: 4 MMOL/L
AST SERPL W P-5'-P-CCNC: 23 U/L (ref 5–45)
BASOPHILS # BLD AUTO: 0.03 THOUSANDS/ÂΜL (ref 0–0.1)
BASOPHILS NFR BLD AUTO: 1 % (ref 0–1)
BILIRUB SERPL-MCNC: 0.62 MG/DL (ref 0.2–1)
BUN SERPL-MCNC: 17 MG/DL (ref 5–25)
CALCIUM SERPL-MCNC: 9 MG/DL (ref 8.3–10.1)
CHLORIDE SERPL-SCNC: 109 MMOL/L (ref 96–108)
CHOLEST SERPL-MCNC: 216 MG/DL
CO2 SERPL-SCNC: 27 MMOL/L (ref 21–32)
CREAT SERPL-MCNC: 0.89 MG/DL (ref 0.6–1.3)
EOSINOPHIL # BLD AUTO: 0.11 THOUSAND/ÂΜL (ref 0–0.61)
EOSINOPHIL NFR BLD AUTO: 2 % (ref 0–6)
ERYTHROCYTE [DISTWIDTH] IN BLOOD BY AUTOMATED COUNT: 13.1 % (ref 11.6–15.1)
GFR SERPL CREATININE-BSD FRML MDRD: 89 ML/MIN/1.73SQ M
GLUCOSE P FAST SERPL-MCNC: 98 MG/DL (ref 65–99)
HCT VFR BLD AUTO: 41.1 % (ref 36.5–49.3)
HDLC SERPL-MCNC: 60 MG/DL
HGB BLD-MCNC: 13.5 G/DL (ref 12–17)
IMM GRANULOCYTES # BLD AUTO: 0.01 THOUSAND/UL (ref 0–0.2)
IMM GRANULOCYTES NFR BLD AUTO: 0 % (ref 0–2)
LDLC SERPL CALC-MCNC: 133 MG/DL (ref 0–100)
LYMPHOCYTES # BLD AUTO: 1.38 THOUSANDS/ÂΜL (ref 0.6–4.47)
LYMPHOCYTES NFR BLD AUTO: 30 % (ref 14–44)
MCH RBC QN AUTO: 29.7 PG (ref 26.8–34.3)
MCHC RBC AUTO-ENTMCNC: 32.8 G/DL (ref 31.4–37.4)
MCV RBC AUTO: 90 FL (ref 82–98)
MONOCYTES # BLD AUTO: 0.41 THOUSAND/ÂΜL (ref 0.17–1.22)
MONOCYTES NFR BLD AUTO: 9 % (ref 4–12)
NEUTROPHILS # BLD AUTO: 2.69 THOUSANDS/ÂΜL (ref 1.85–7.62)
NEUTS SEG NFR BLD AUTO: 58 % (ref 43–75)
NRBC BLD AUTO-RTO: 0 /100 WBCS
PLATELET # BLD AUTO: 216 THOUSANDS/UL (ref 149–390)
PMV BLD AUTO: 10.9 FL (ref 8.9–12.7)
POTASSIUM SERPL-SCNC: 4.3 MMOL/L (ref 3.5–5.3)
PROT SERPL-MCNC: 7.5 G/DL (ref 6.4–8.4)
PSA SERPL-MCNC: 0.52 NG/ML (ref 0–4)
RBC # BLD AUTO: 4.55 MILLION/UL (ref 3.88–5.62)
SODIUM SERPL-SCNC: 140 MMOL/L (ref 135–147)
TRIGL SERPL-MCNC: 116 MG/DL
WBC # BLD AUTO: 4.63 THOUSAND/UL (ref 4.31–10.16)

## 2023-07-20 PROCEDURE — G0103 PSA SCREENING: HCPCS

## 2023-07-20 PROCEDURE — 85025 COMPLETE CBC W/AUTO DIFF WBC: CPT

## 2023-07-20 PROCEDURE — 80053 COMPREHEN METABOLIC PANEL: CPT

## 2023-07-20 PROCEDURE — 36415 COLL VENOUS BLD VENIPUNCTURE: CPT

## 2023-07-20 PROCEDURE — 80061 LIPID PANEL: CPT

## 2023-08-29 ENCOUNTER — RA CDI HCC (OUTPATIENT)
Dept: OTHER | Facility: HOSPITAL | Age: 63
End: 2023-08-29

## 2023-08-29 NOTE — PROGRESS NOTES
[address] [facility] coding opportunities       Chart reviewed, no opportunity found: CHART REVIEWED, NO OPPORTUNITY FOUND        Patients Insurance     Medicare Insurance: Medicare

## 2023-09-01 ENCOUNTER — CONSULT (OUTPATIENT)
Dept: INTERNAL MEDICINE CLINIC | Facility: CLINIC | Age: 63
End: 2023-09-01
Payer: MEDICARE

## 2023-09-01 VITALS
BODY MASS INDEX: 21.52 KG/M2 | DIASTOLIC BLOOD PRESSURE: 74 MMHG | TEMPERATURE: 96.9 F | WEIGHT: 125.4 LBS | SYSTOLIC BLOOD PRESSURE: 120 MMHG | HEART RATE: 87 BPM | OXYGEN SATURATION: 98 %

## 2023-09-01 DIAGNOSIS — Z01.818 PRE-OP EXAM: Primary | ICD-10-CM

## 2023-09-01 DIAGNOSIS — L50.9 URTICARIA: ICD-10-CM

## 2023-09-01 DIAGNOSIS — J01.10 ACUTE NON-RECURRENT FRONTAL SINUSITIS: ICD-10-CM

## 2023-09-01 PROCEDURE — 99213 OFFICE O/P EST LOW 20 MIN: CPT | Performed by: INTERNAL MEDICINE

## 2023-09-01 RX ORDER — DEXTROMETHORPHAN HYDROBROMIDE AND PROMETHAZINE HYDROCHLORIDE 15; 6.25 MG/5ML; MG/5ML
5 SYRUP ORAL 4 TIMES DAILY PRN
Qty: 240 ML | Refills: 0 | Status: SHIPPED | OUTPATIENT
Start: 2023-09-01 | End: 2023-09-08

## 2023-09-01 RX ORDER — AMOXICILLIN AND CLAVULANATE POTASSIUM 875; 125 MG/1; MG/1
1 TABLET, FILM COATED ORAL EVERY 12 HOURS SCHEDULED
Qty: 14 TABLET | Refills: 0 | Status: SHIPPED | OUTPATIENT
Start: 2023-09-01 | End: 2023-09-08

## 2023-09-01 RX ORDER — LEVOCETIRIZINE DIHYDROCHLORIDE 5 MG/1
5 TABLET, FILM COATED ORAL EVERY EVENING
Qty: 30 TABLET | Refills: 5 | Status: SHIPPED | OUTPATIENT
Start: 2023-09-01 | End: 2024-02-28

## 2023-09-01 NOTE — PROGRESS NOTES
Name: Carmita Schaefer      : 1960      MRN: [id number]  Encounter Provider: Agustin De La Rosa DO  Encounter Date: 2023   Encounter department: [address] [place] [address] [facility]     1. Pre-op exam  Comments:  Cleared for surrgery and low cardiac risk    2. Acute non-recurrent frontal sinusitis  Comments:  Augmentin 875/125 mg BID  Phenergan DM PRN   Follow up PRN  Orders:  -     amoxicillin-clavulanate (AUGMENTIN) 875-125 mg per tablet; Take 1 tablet by mouth every 12 (twelve) hours for 7 days  -     promethazine-dextromethorphan (PHENERGAN-DM) 6.25-15 mg/5 mL oral syrup; Take 5 mL by mouth 4 (four) times a day as needed for cough for up to 7 days    3. Urticaria  Comments:  Xyzal restarted  Orders:  -     levocetirizine (XYZAL) 5 MG tablet; Take 1 tablet (5 mg total) by mouth every evening           Subjective     The patient is here for 3 reasons. He has an issue with sinusitis x 10 days with continued mild cough and sore throat. He has issues with urticaria on his anterior chest wall. The patient needs clearance for Right Cataract Surgery with Dr Annamarie Kocher on 2023. Review of Systems   Constitutional: Negative for chills, fatigue and fever. HENT: Negative. Respiratory: Positive for cough. Negative for chest tightness and shortness of breath. Cardiovascular: Negative for chest pain and palpitations. Gastrointestinal: Negative for abdominal pain, constipation, diarrhea, nausea and vomiting. Genitourinary: Negative. Musculoskeletal: Negative for back pain and myalgias. Skin: Negative. Neurological: Negative. Psychiatric/Behavioral: Negative for dysphoric mood. The patient is not nervous/anxious.         Past Medical History:   Diagnosis Date   • Kidney stone      Past Surgical History:   Procedure Laterality Date   • CATARACT EXTRACTION Right    • KIDNEY STONE SURGERY       Family History   Problem Relation Age of Onset   • No Known Problems Mother • No Known Problems Father      Social History     Socioeconomic History   • Marital status: /Civil Union     Spouse name: None   • Number of children: None   • Years of education: None   • Highest education level: None   Occupational History   • Occupation: employed   Tobacco Use   • Smoking status: Never   • Smokeless tobacco: Never   Vaping Use   • Vaping Use: Never used   Substance and Sexual Activity   • Alcohol use: Not Currently   • Drug use: Never   • Sexual activity: Not Currently   Other Topics Concern   • None   Social History Narrative        employed     Social Determinants of Health     Financial Resource Strain: Low Risk  (7/14/2023)    Overall Financial Resource Strain (CARDIA)    • Difficulty of Paying Living Expenses: Not very hard   Food Insecurity: Not on file   Transportation Needs: No Transportation Needs (7/14/2023)    PRAPARE - Transportation    • Lack of Transportation (Medical): No    • Lack of Transportation (Non-Medical): No   Physical Activity: Not on file   Stress: Not on file   Social Connections: Not on file   Intimate Partner Violence: Not on file   Housing Stability: Not on file     Current Outpatient Medications on File Prior to Visit   Medication Sig   • Diclofenac Sodium (VOLTAREN) 1 % Apply 2 g topically 4 (four) times a day     No Known Allergies  Immunization History   Administered Date(s) Administered   • COVID-19 MODERNA VACC 0.5 ML IM 05/20/2021, 06/17/2021, 02/04/2022   • INFLUENZA 10/23/2020, 10/05/2022   • Influenza, recombinant, quadrivalent,injectable, preservative free 10/22/2021, 10/05/2022   • Tdap 03/11/2009, 01/14/2022       Objective     /74 (BP Location: Left arm, Patient Position: Sitting, Cuff Size: Standard)   Pulse 87   Temp (!) 96.9 °F (36.1 °C)   Wt 56.9 kg (125 lb 6.4 oz)   SpO2 98%   BMI 21.52 kg/m²     Physical Exam  Vitals and nursing note reviewed. Constitutional:       Appearance: He is well-developed.    HENT:      Head: Normocephalic and atraumatic. Eyes:      Pupils: Pupils are equal, round, and reactive to light. Cardiovascular:      Rate and Rhythm: Normal rate and regular rhythm. Heart sounds: Normal heart sounds. No murmur heard. Pulmonary:      Effort: Pulmonary effort is normal.      Breath sounds: Normal breath sounds. No stridor. No rales. Abdominal:      General: Bowel sounds are normal. There is no distension. Palpations: Abdomen is soft. Tenderness: There is no abdominal tenderness. Musculoskeletal:         General: No deformity. Normal range of motion. Cervical back: Normal range of motion and neck supple. Skin:     General: Skin is warm and dry. Neurological:      Mental Status: He is alert and oriented to person, place, and time.        Omari Morris, DO

## 2023-09-01 NOTE — PROGRESS NOTES
Name: Wilian Swan      : 1960      MRN: [id number]  Encounter Provider: Christie Olivares DO  Encounter Date: 2023   Encounter department: [address] [place] [address]     1. Acute non-recurrent frontal sinusitis  -     amoxicillin-clavulanate (AUGMENTIN) 875-125 mg per tablet; Take 1 tablet by mouth every 12 (twelve) hours for 7 days  -     promethazine-dextromethorphan (PHENERGAN-DM) 6.25-15 mg/5 mL oral syrup; Take 5 mL by mouth 4 (four) times a day as needed for cough for up to 7 days    2. Urticaria  -     levocetirizine (XYZAL) 5 MG tablet; Take 1 tablet (5 mg total) by mouth every evening           Subjective     Dr Dwayne Beltrán 2023 OS Cataract    Review of Systems    Past Medical History:   Diagnosis Date   • Kidney stone      Past Surgical History:   Procedure Laterality Date   • CATARACT EXTRACTION Right    • KIDNEY STONE SURGERY       Family History   Problem Relation Age of Onset   • No Known Problems Mother    • No Known Problems Father      Social History     Socioeconomic History   • Marital status: /Civil Union     Spouse name: None   • Number of children: None   • Years of education: None   • Highest education level: None   Occupational History   • Occupation: employed   Tobacco Use   • Smoking status: Never   • Smokeless tobacco: Never   Vaping Use   • Vaping Use: Never used   Substance and Sexual Activity   • Alcohol use: Not Currently   • Drug use: Never   • Sexual activity: Not Currently   Other Topics Concern   • None   Social History Narrative        employed     Social Determinants of Health     Financial Resource Strain: Low Risk  (2023)    Overall Financial Resource Strain (CARDIA)    • Difficulty of Paying Living Expenses: Not very hard   Food Insecurity: Not on file   Transportation Needs: No Transportation Needs (2023)    PRAPARE - Transportation    • Lack of Transportation (Medical):  No    • Lack of Transportation (Non-Medical):  No   Physical Activity: Not on file   Stress: Not on file   Social Connections: Not on file   Intimate Partner Violence: Not on file   Housing Stability: Not on file     Current Outpatient Medications on File Prior to Visit   Medication Sig   • Diclofenac Sodium (VOLTAREN) 1 % Apply 2 g topically 4 (four) times a day     No Known Allergies  Immunization History   Administered Date(s) Administered   • COVID-19 MODERNA VACC 0.5 ML IM 05/20/2021, 06/17/2021, 02/04/2022   • INFLUENZA 10/23/2020, 10/05/2022   • Influenza, recombinant, quadrivalent,injectable, preservative free 10/22/2021, 10/05/2022   • Tdap 03/11/2009, 01/14/2022       Objective     /74 (BP Location: Left arm, Patient Position: Sitting, Cuff Size: Standard)   Pulse 87   Temp (!) 96.9 °F (36.1 °C)   Wt 56.9 kg (125 lb 6.4 oz)   SpO2 98%   BMI 21.52 kg/m²     Physical Exam  Parveen Michael, DO

## 2023-09-20 ENCOUNTER — OFFICE VISIT (OUTPATIENT)
Dept: URGENT CARE | Facility: CLINIC | Age: 63
End: 2023-09-20
Payer: MEDICARE

## 2023-09-20 VITALS
WEIGHT: 125 LBS | HEIGHT: 64 IN | OXYGEN SATURATION: 100 % | RESPIRATION RATE: 16 BRPM | HEART RATE: 66 BPM | BODY MASS INDEX: 21.34 KG/M2 | SYSTOLIC BLOOD PRESSURE: 112 MMHG | DIASTOLIC BLOOD PRESSURE: 60 MMHG

## 2023-09-20 DIAGNOSIS — L23.7 POISON IVY DERMATITIS: Primary | ICD-10-CM

## 2023-09-20 PROCEDURE — 99213 OFFICE O/P EST LOW 20 MIN: CPT | Performed by: PHYSICIAN ASSISTANT

## 2023-09-20 PROCEDURE — G0463 HOSPITAL OUTPT CLINIC VISIT: HCPCS | Performed by: PHYSICIAN ASSISTANT

## 2023-09-20 RX ORDER — PREDNISONE 10 MG/1
TABLET ORAL
Qty: 26 TABLET | Refills: 0 | Status: SHIPPED | OUTPATIENT
Start: 2023-09-20

## 2023-09-20 NOTE — PROGRESS NOTES
[facility]      NAME: Randi Waller is a 70 y.o. male  : 1960    MRN: [id number]  DATE: 2023  TIME: 10:24 AM    Assessment and Plan   Poison ivy dermatitis [L23.7]  1. Poison ivy dermatitis  predniSONE 10 mg tablet          Patient Instructions   How your poison ivy rash spreads: You cannot spread poison ivy by touching your rash or the liquid from your blisters. Poison ivy is spread only if you scratch your skin while it still has oil on it. You may think your rash is spreading because new rashes appear over a number of days. This happens because areas covered by thin skin break out in a rash first. Your face or forearms may develop a rash before thicker areas, such as the palms of your hands. Self-care:   · Keep your rash clean and dry:  Wash it with soap and water. Gently pat it dry with a clean towel. · Try not to scratch or rub your rash: This can cause your skin to become infected. · Use a compress on your rash:  Dip a clean washcloth in cool water. Wring it out and place it on your rash. Leave the washcloth on your skin for 15 minutes. Do this at least 3 times per day. · Take a cornstarch or oatmeal bath: If your rash is too large to cover with wet washcloths, take 3 or 4 cornstarch baths daily. Mix 1 pound of cornstarch with a little water to make a paste. Add the paste to a tub full of water and mix well. You may also use colloidal oatmeal in the bath water. Use lukewarm water. Avoid hot water because it may cause your itching to increase. Prevent a poison ivy rash in the future:   · Wear skin protection:  Wear long pants, a long-sleeved shirt, and gloves. Use a skin block lotion to protect your skin from poison ivy oil. You can find this at a drugstore without a prescription. · Wash clothing after possible exposure: If you think you have been near a poison ivy plant, wash the clothes you were wearing separately from other clothes.  Rinse the washing machine well after you take the clothes out. Scrub boots and shoes with warm, soapy water. Dry clean items and clothing that you cannot wash in water. Poison ivy oil is sticky and can stay on surfaces for a long time. It can cause a new rash even years later. · Bathe your pet:  Use warm water and shampoo on your pet's fur. This will prevent the spread of oil to your skin, car, and home. Wear long sleeves, long pants, and gloves while washing pets or any items that may have oil on them. · Reduce exposure to poison ivy:  Do not touch plants that look like poison ivy. Keep your yard free of poison ivy. While protecting your skin, remove the plant and the roots. Place them in a plastic bag and seal the bag tightly. · Do not burn poison ivy plants: This can spread the oil through the air. If you breathe the oil into your lungs, you could have swelling and serious breathing problems. Oil that clings to the fire zohreh can land on your skin and cause a rash. To present to the ER if symptoms worsen. Chief Complaint     Chief Complaint   Patient presents with   • Rash     Patient c/o itchy rash on both arms that started yesterday. History of Present Illness   José Antonio Payne presents to the clinic c/o    Poison Guadalupe Erickson  This is a new problem. The current episode started yesterday. The problem is unchanged. Location: bilateral arms, chest. The rash is characterized by blistering, itchiness and draining. He was exposed to plant contact. Pertinent negatives include no congestion, cough, eye pain, fatigue, fever or shortness of breath. Treatments tried: calamine. The treatment provided no relief. Review of Systems   Review of Systems   Constitutional: Negative for chills, diaphoresis, fatigue and fever. HENT: Negative for congestion, ear discharge, ear pain and facial swelling. Eyes: Negative for photophobia, pain, discharge, redness, itching and visual disturbance.    Respiratory: Negative for apnea, cough, chest tightness, shortness of breath and wheezing. Cardiovascular: Negative for chest pain and palpitations. Gastrointestinal: Negative for abdominal pain. Skin: Positive for rash. Negative for color change and wound. Neurological: Negative for dizziness and headaches. Hematological: Negative for adenopathy.          Current Medications     Long-Term Medications   Medication Sig Dispense Refill   • Diclofenac Sodium (VOLTAREN) 1 % Apply 2 g topically 4 (four) times a day (Patient not taking: Reported on 9/20/2023) 150 g 2   • levocetirizine (XYZAL) 5 MG tablet Take 1 tablet (5 mg total) by mouth every evening (Patient not taking: Reported on 9/20/2023) 30 tablet 5       Current Allergies     Allergies as of 09/20/2023   • (No Known Allergies)            The following portions of the patient's history were reviewed and updated as appropriate: allergies, current medications, past family history, past medical history, past social history, past surgical history and problem list.  Past Medical History:   Diagnosis Date   • Kidney stone      Past Surgical History:   Procedure Laterality Date   • CATARACT EXTRACTION Right    • KIDNEY STONE SURGERY       Social History     Socioeconomic History   • Marital status: /Civil Union     Spouse name: Not on file   • Number of children: Not on file   • Years of education: Not on file   • Highest education level: Not on file   Occupational History   • Occupation: employed   Tobacco Use   • Smoking status: Never   • Smokeless tobacco: Never   Vaping Use   • Vaping Use: Never used   Substance and Sexual Activity   • Alcohol use: Not Currently   • Drug use: Never   • Sexual activity: Not Currently   Other Topics Concern   • Not on file   Social History Narrative        employed     Social Determinants of Health     Financial Resource Strain: Low Risk  (7/14/2023)    Overall Financial Resource Strain (CARDIA)    • Difficulty of Paying Living Expenses: Not very hard   Food Insecurity: Not on file   Transportation Needs: No Transportation Needs (7/14/2023)    PRAPARE - Transportation    • Lack of Transportation (Medical): No    • Lack of Transportation (Non-Medical): No   Physical Activity: Not on file   Stress: Not on file   Social Connections: Not on file   Intimate Partner Violence: Not on file   Housing Stability: Not on file       Objective   /60   Pulse 66   Resp 16   Ht 5' 4" (1.626 m)   Wt 56.7 kg (125 lb)   SpO2 100%   BMI 21.46 kg/m²      Physical Exam     Physical Exam  Vitals and nursing note reviewed. Constitutional:       General: He is not in acute distress. Appearance: He is well-developed. He is not diaphoretic. HENT:      Head: Normocephalic and atraumatic. Right Ear: Tympanic membrane and external ear normal.      Left Ear: Tympanic membrane and external ear normal.      Nose: Nose normal.      Mouth/Throat:      Mouth: Mucous membranes are moist.      Pharynx: No oropharyngeal exudate or posterior oropharyngeal erythema. Eyes:      General: No scleral icterus. Right eye: No discharge. Left eye: No discharge. Conjunctiva/sclera: Conjunctivae normal.   Cardiovascular:      Rate and Rhythm: Normal rate and regular rhythm. Heart sounds: Normal heart sounds. No murmur heard. No friction rub. No gallop. Pulmonary:      Effort: Pulmonary effort is normal. No respiratory distress. Breath sounds: Normal breath sounds. No decreased breath sounds, wheezing, rhonchi or rales. Skin:     General: Skin is warm and dry. Coloration: Skin is not pale. Findings: Rash (macular papular rash on b/l arms and chest and some vesicles on b/l arms consistent wiht poison ivy, no signs of secondary bacterial infection noted) present. No erythema. Neurological:      Mental Status: He is alert and oriented to person, place, and time.    Psychiatric:         Behavior: Behavior normal. Thought Content:  Thought content normal.         Judgment: Judgment normal.         Phil Garcia PA-C

## 2024-01-04 ENCOUNTER — OFFICE VISIT (OUTPATIENT)
Dept: URGENT CARE | Facility: CLINIC | Age: 64
End: 2024-01-04
Payer: MEDICARE

## 2024-01-04 VITALS
HEIGHT: 64 IN | WEIGHT: 129 LBS | BODY MASS INDEX: 22.02 KG/M2 | SYSTOLIC BLOOD PRESSURE: 142 MMHG | HEART RATE: 76 BPM | RESPIRATION RATE: 16 BRPM | OXYGEN SATURATION: 100 % | TEMPERATURE: 97.8 F | DIASTOLIC BLOOD PRESSURE: 65 MMHG

## 2024-01-04 DIAGNOSIS — H65.92 FLUID LEVEL BEHIND TYMPANIC MEMBRANE OF LEFT EAR: Primary | ICD-10-CM

## 2024-01-04 PROCEDURE — G0463 HOSPITAL OUTPT CLINIC VISIT: HCPCS | Performed by: NURSE PRACTITIONER

## 2024-01-04 PROCEDURE — 99213 OFFICE O/P EST LOW 20 MIN: CPT | Performed by: NURSE PRACTITIONER

## 2024-01-04 NOTE — PATIENT INSTRUCTIONS
Recommend flonase and zyrtec.   Recommend over-the-counter Tylenol or Motrin as needed for pain.  If you develop any increased pain, swelling, prolonged high fever, dizziness, or any new or concerning symptoms please return or proceed ER.  Advised follow-up with PCP in 3 to 5 days.

## 2024-01-04 NOTE — PROGRESS NOTES
[facility]        NAME: José Antonio Payne is a 63 y.o. male  : 1960    MRN: [id number]  DATE: 2024  TIME: 4:03 PM    Assessment and Plan   Fluid level behind tympanic membrane of left ear [H65.92]  1. Fluid level behind tympanic membrane of left ear              Patient Instructions     Patient Instructions   Recommend flonase and zyrtec.   Recommend over-the-counter Tylenol or Motrin as needed for pain.  If you develop any increased pain, swelling, prolonged high fever, dizziness, or any new or concerning symptoms please return or proceed ER.  Advised follow-up with PCP in 3 to 5 days.    Chief Complaint     Chief Complaint   Patient presents with    Earache     Left earache started 2 weeks ago         History of Present Illness       Earache   There is pain in the left ear. This is a new problem. The current episode started 1 to 4 weeks ago (2 weeks ago). The problem occurs constantly. The problem has been unchanged. There has been no fever. The pain is moderate. Pertinent negatives include no abdominal pain, coughing, diarrhea, ear discharge, headaches, hearing loss, neck pain, rash, rhinorrhea, sore throat or vomiting. He has tried nothing for the symptoms. The treatment provided no relief. There is no history of a chronic ear infection, hearing loss or a tympanostomy tube.       Review of Systems   Review of Systems   Constitutional:  Negative for chills, diaphoresis, fatigue and fever.   HENT:  Positive for ear pain. Negative for congestion, ear discharge, facial swelling, hearing loss, mouth sores, postnasal drip, rhinorrhea, sinus pressure, sinus pain, sore throat and trouble swallowing.    Eyes:  Negative for photophobia and visual disturbance.   Respiratory:  Negative for cough, chest tightness and shortness of breath.    Cardiovascular:  Negative for chest pain.   Gastrointestinal:  Negative for abdominal pain, diarrhea, nausea and vomiting.   Genitourinary:  "Negative.    Musculoskeletal:  Negative for arthralgias, back pain, joint swelling, myalgias, neck pain and neck stiffness.   Skin:  Negative for rash.   Neurological:  Negative for dizziness, facial asymmetry, weakness, light-headedness, numbness and headaches.         Current Medications       Current Outpatient Medications:     Diclofenac Sodium (VOLTAREN) 1 %, Apply 2 g topically 4 (four) times a day (Patient not taking: Reported on 9/20/2023), Disp: 150 g, Rfl: 2    levocetirizine (XYZAL) 5 MG tablet, Take 1 tablet (5 mg total) by mouth every evening (Patient not taking: Reported on 9/20/2023), Disp: 30 tablet, Rfl: 5    predniSONE 10 mg tablet, Take 3 tabs BID X 2 days, 2 tabs BID X 2 days, 1 tab BID X 2 days, 1 tab daily X 2 days (Patient not taking: Reported on 1/4/2024), Disp: 26 tablet, Rfl: 0    Current Allergies     Allergies as of 01/04/2024    (No Known Allergies)            The following portions of the patient's history were reviewed and updated as appropriate: allergies, current medications, past family history, past medical history, past social history, past surgical history and problem list.     Past Medical History:   Diagnosis Date    Kidney stone        Past Surgical History:   Procedure Laterality Date    CATARACT EXTRACTION Right     KIDNEY STONE SURGERY         Family History   Problem Relation Age of Onset    No Known Problems Mother     No Known Problems Father          Medications have been verified.        Objective   /65   Pulse 76   Temp 97.8 °F (36.6 °C)   Resp 16   Ht 5' 4\" (1.626 m)   Wt 58.5 kg (129 lb)   SpO2 100%   BMI 22.14 kg/m²   No LMP for male patient.       Physical Exam     Physical Exam  Constitutional:       General: He is not in acute distress.     Appearance: He is well-developed.   HENT:      Head: Normocephalic and atraumatic.      Right Ear: Hearing, tympanic membrane, ear canal and external ear normal.      Left Ear: Hearing, ear canal and external ear " normal. A middle ear effusion is present.      Nose: Nose normal.      Right Sinus: No maxillary sinus tenderness or frontal sinus tenderness.      Left Sinus: No maxillary sinus tenderness or frontal sinus tenderness.      Mouth/Throat:      Mouth: Mucous membranes are moist.      Pharynx: Oropharynx is clear. Uvula midline. No oropharyngeal exudate or posterior oropharyngeal erythema.      Tonsils: No tonsillar exudate or tonsillar abscesses. 1+ on the right. 1+ on the left.   Cardiovascular:      Rate and Rhythm: Normal rate and regular rhythm.      Heart sounds: Normal heart sounds, S1 normal and S2 normal.   Pulmonary:      Effort: Pulmonary effort is normal.      Breath sounds: Normal breath sounds and air entry.   Lymphadenopathy:      Cervical: No cervical adenopathy.   Skin:     General: Skin is warm and dry.      Capillary Refill: Capillary refill takes less than 2 seconds.   Neurological:      Mental Status: He is alert and oriented to person, place, and time.

## 2024-03-20 ENCOUNTER — OFFICE VISIT (OUTPATIENT)
Dept: INTERNAL MEDICINE CLINIC | Facility: CLINIC | Age: 64
End: 2024-03-20
Payer: MEDICARE

## 2024-03-20 ENCOUNTER — APPOINTMENT (OUTPATIENT)
Dept: RADIOLOGY | Facility: CLINIC | Age: 64
End: 2024-03-20
Payer: MEDICARE

## 2024-03-20 VITALS
HEIGHT: 64 IN | DIASTOLIC BLOOD PRESSURE: 72 MMHG | TEMPERATURE: 96.7 F | HEART RATE: 72 BPM | SYSTOLIC BLOOD PRESSURE: 126 MMHG | BODY MASS INDEX: 22.02 KG/M2 | WEIGHT: 129 LBS

## 2024-03-20 DIAGNOSIS — J30.1 SEASONAL ALLERGIC RHINITIS DUE TO POLLEN: ICD-10-CM

## 2024-03-20 DIAGNOSIS — J30.2 SEASONAL ALLERGIC RHINITIS DUE TO FUNGAL SPORES: ICD-10-CM

## 2024-03-20 DIAGNOSIS — Z13.1 SCREENING FOR DIABETES MELLITUS: ICD-10-CM

## 2024-03-20 DIAGNOSIS — Z13.0 SCREENING, ANEMIA, DEFICIENCY, IRON: ICD-10-CM

## 2024-03-20 DIAGNOSIS — M19.071 ARTHRITIS OF BOTH FEET: ICD-10-CM

## 2024-03-20 DIAGNOSIS — Z12.5 SCREENING PSA (PROSTATE SPECIFIC ANTIGEN): ICD-10-CM

## 2024-03-20 DIAGNOSIS — M19.072 ARTHRITIS OF BOTH FEET: ICD-10-CM

## 2024-03-20 DIAGNOSIS — M79.672 PAIN IN BOTH FEET: ICD-10-CM

## 2024-03-20 DIAGNOSIS — Z23 ENCOUNTER FOR IMMUNIZATION: Primary | ICD-10-CM

## 2024-03-20 DIAGNOSIS — Z13.220 SCREENING FOR HYPERCHOLESTEROLEMIA: ICD-10-CM

## 2024-03-20 DIAGNOSIS — M77.32 HEEL SPUR, LEFT: ICD-10-CM

## 2024-03-20 DIAGNOSIS — M79.671 PAIN IN BOTH FEET: ICD-10-CM

## 2024-03-20 PROCEDURE — 73630 X-RAY EXAM OF FOOT: CPT

## 2024-03-20 PROCEDURE — 99214 OFFICE O/P EST MOD 30 MIN: CPT | Performed by: INTERNAL MEDICINE

## 2024-03-20 PROCEDURE — G2211 COMPLEX E/M VISIT ADD ON: HCPCS | Performed by: INTERNAL MEDICINE

## 2024-03-20 RX ORDER — LEVOCETIRIZINE DIHYDROCHLORIDE 5 MG/1
5 TABLET, FILM COATED ORAL EVERY EVENING
Qty: 30 TABLET | Refills: 5 | Status: SHIPPED | OUTPATIENT
Start: 2024-03-20 | End: 2024-09-16

## 2024-03-20 NOTE — PROGRESS NOTES
Assessment/Plan:    Problem List Items Addressed This Visit     Seasonal allergic rhinitis due to pollen     The patient will take the Xyzal daily         Heel spur, left    Relevant Orders    Ambulatory Referral to Podiatry    XR foot 3+ vw left   Other Visit Diagnoses     Encounter for immunization    -  Primary    Arthritis of both feet        Relevant Medications    Diclofenac Sodium (VOLTAREN) 1 %    Pain in both feet        Relevant Medications    Diclofenac Sodium (VOLTAREN) 1 %    Seasonal allergic rhinitis due to fungal spores        Xyzal restarted    Relevant Medications    levocetirizine (XYZAL) 5 MG tablet    Screening for hypercholesterolemia        Relevant Orders    Lipid Panel with Direct LDL reflex    Screening for diabetes mellitus        Relevant Orders    Comprehensive metabolic panel    Screening, anemia, deficiency, iron        Relevant Orders    CBC and differential    Screening PSA (prostate specific antigen)        Relevant Orders    PSA, Total Screen           Diagnoses and all orders for this visit:    Encounter for immunization    Heel spur, left  -     Ambulatory Referral to Podiatry; Future  -     XR foot 3+ vw left; Future    Arthritis of both feet  -     Diclofenac Sodium (VOLTAREN) 1 %; Apply 2 g topically 4 (four) times a day    Seasonal allergic rhinitis due to pollen    Pain in both feet  -     Diclofenac Sodium (VOLTAREN) 1 %; Apply 2 g topically 4 (four) times a day    Seasonal allergic rhinitis due to fungal spores  Comments:  Xyzal restarted  Orders:  -     levocetirizine (XYZAL) 5 MG tablet; Take 1 tablet (5 mg total) by mouth every evening    Screening for hypercholesterolemia  -     Lipid Panel with Direct LDL reflex; Future    Screening for diabetes mellitus  -     Comprehensive metabolic panel; Future    Screening, anemia, deficiency, iron  -     CBC and differential; Future    Screening PSA (prostate specific antigen)  -     PSA, Total Screen; Future        Seasonal  allergic rhinitis due to pollen  The patient will take the Xyzal daily        Subjective:      Patient ID: José Antonio Payne is a 64 y.o. male.    Continue mild pain in the left heel.  Allergic issues noted on without use of Xyzal.        The following portions of the patient's history were reviewed and updated as appropriate:   He has a past medical history of Kidney stone.,  does not have any pertinent problems on file.,   has a past surgical history that includes Kidney stone surgery and Cataract extraction (Right).,  family history includes No Known Problems in his father and mother.,   reports that he has never smoked. He has never been exposed to tobacco smoke. He has never used smokeless tobacco. He reports that he does not currently use alcohol. He reports that he does not use drugs.,  has No Known Allergies..  Current Outpatient Medications   Medication Sig Dispense Refill   • Diclofenac Sodium (VOLTAREN) 1 % Apply 2 g topically 4 (four) times a day 150 g 2   • levocetirizine (XYZAL) 5 MG tablet Take 1 tablet (5 mg total) by mouth every evening 30 tablet 5   • predniSONE 10 mg tablet Take 3 tabs BID X 2 days, 2 tabs BID X 2 days, 1 tab BID X 2 days, 1 tab daily X 2 days (Patient not taking: Reported on 1/4/2024) 26 tablet 0     No current facility-administered medications for this visit.       Review of Systems   Constitutional:  Negative for chills, fatigue and fever.   HENT: Negative.     Respiratory:  Negative for cough, chest tightness and shortness of breath.    Cardiovascular:  Negative for chest pain and palpitations.   Gastrointestinal:  Negative for abdominal pain, constipation, diarrhea, nausea and vomiting.   Genitourinary: Negative.    Musculoskeletal:  Negative for back pain and myalgias.   Skin: Negative.    Neurological: Negative.    Psychiatric/Behavioral:  Negative for dysphoric mood. The patient is not nervous/anxious.          Objective:  Vitals:    03/20/24 1017   BP: 126/72  "  Pulse: 72   Temp: (!) 96.7 °F (35.9 °C)   Weight: 58.5 kg (129 lb)   Height: 5' 4\" (1.626 m)     Body mass index is 22.14 kg/m².     Physical Exam  Vitals and nursing note reviewed.   Constitutional:       Appearance: He is well-developed.   HENT:      Head: Normocephalic and atraumatic.   Eyes:      Pupils: Pupils are equal, round, and reactive to light.   Cardiovascular:      Rate and Rhythm: Normal rate and regular rhythm.      Heart sounds: Normal heart sounds. No murmur heard.  Pulmonary:      Effort: Pulmonary effort is normal.      Breath sounds: Normal breath sounds. No stridor. No rales.   Abdominal:      General: Bowel sounds are normal. There is no distension.      Palpations: Abdomen is soft.      Tenderness: There is no abdominal tenderness.   Musculoskeletal:         General: No deformity. Normal range of motion.      Cervical back: Normal range of motion and neck supple.   Skin:     General: Skin is warm and dry.   Neurological:      Mental Status: He is alert and oriented to person, place, and time.          PHQ-2/9 Depression Screening    Little interest or pleasure in doing things: 0 - not at all  Feeling down, depressed, or hopeless: 0 - not at all  PHQ-2 Score: 0  PHQ-2 Interpretation: Negative depression screen         "

## 2024-11-04 ENCOUNTER — IMMUNIZATIONS (OUTPATIENT)
Dept: INTERNAL MEDICINE CLINIC | Facility: CLINIC | Age: 64
End: 2024-11-04
Payer: MEDICARE

## 2024-11-04 DIAGNOSIS — Z23 ENCOUNTER FOR IMMUNIZATION: Primary | ICD-10-CM

## 2024-11-04 PROCEDURE — 90662 IIV NO PRSV INCREASED AG IM: CPT

## 2024-11-04 PROCEDURE — G0008 ADMIN INFLUENZA VIRUS VAC: HCPCS

## 2025-04-03 ENCOUNTER — VBI (OUTPATIENT)
Dept: ADMINISTRATIVE | Facility: OTHER | Age: 65
End: 2025-04-03

## 2025-04-03 NOTE — TELEPHONE ENCOUNTER
Patient contacted to schedule Annual Wellness Visit.   Patient agreed to schedule appointment.      Thank you.  Loreto Womack  [facility]

## 2025-05-14 ENCOUNTER — OFFICE VISIT (OUTPATIENT)
Dept: INTERNAL MEDICINE CLINIC | Facility: CLINIC | Age: 65
End: 2025-05-14
Payer: MEDICARE

## 2025-05-14 VITALS
HEIGHT: 64 IN | BODY MASS INDEX: 22.02 KG/M2 | OXYGEN SATURATION: 99 % | HEART RATE: 77 BPM | WEIGHT: 129 LBS | DIASTOLIC BLOOD PRESSURE: 60 MMHG | TEMPERATURE: 98.1 F | SYSTOLIC BLOOD PRESSURE: 110 MMHG

## 2025-05-14 DIAGNOSIS — N40.1 BENIGN PROSTATIC HYPERPLASIA WITH LOWER URINARY TRACT SYMPTOMS, SYMPTOM DETAILS UNSPECIFIED: ICD-10-CM

## 2025-05-14 DIAGNOSIS — Z12.11 SCREENING FOR COLORECTAL CANCER: Primary | ICD-10-CM

## 2025-05-14 DIAGNOSIS — M79.671 PAIN IN BOTH FEET: ICD-10-CM

## 2025-05-14 DIAGNOSIS — M19.071 ARTHRITIS OF BOTH FEET: ICD-10-CM

## 2025-05-14 DIAGNOSIS — M19.072 ARTHRITIS OF BOTH FEET: ICD-10-CM

## 2025-05-14 DIAGNOSIS — Z12.5 SCREENING PSA (PROSTATE SPECIFIC ANTIGEN): ICD-10-CM

## 2025-05-14 DIAGNOSIS — K52.9 CHRONIC DIARRHEA: ICD-10-CM

## 2025-05-14 DIAGNOSIS — Z13.220 SCREENING FOR HYPERCHOLESTEROLEMIA: ICD-10-CM

## 2025-05-14 DIAGNOSIS — Z12.12 SCREENING FOR COLORECTAL CANCER: Primary | ICD-10-CM

## 2025-05-14 DIAGNOSIS — M79.672 PAIN IN BOTH FEET: ICD-10-CM

## 2025-05-14 DIAGNOSIS — Z00.00 MEDICARE ANNUAL WELLNESS VISIT, SUBSEQUENT: ICD-10-CM

## 2025-05-14 DIAGNOSIS — M17.0 PRIMARY OSTEOARTHRITIS OF BOTH KNEES: ICD-10-CM

## 2025-05-14 PROCEDURE — G2211 COMPLEX E/M VISIT ADD ON: HCPCS | Performed by: INTERNAL MEDICINE

## 2025-05-14 PROCEDURE — 99214 OFFICE O/P EST MOD 30 MIN: CPT | Performed by: INTERNAL MEDICINE

## 2025-05-14 PROCEDURE — G0439 PPPS, SUBSEQ VISIT: HCPCS | Performed by: INTERNAL MEDICINE

## 2025-05-14 NOTE — PROGRESS NOTES
Name: José Antonio Payne      : 1960      MRN: [id number]  Encounter Provider: Avery Hickman DO  Encounter Date: 2025   Encounter department: [facility]  :  Assessment & Plan  Medicare annual wellness visit, subsequent         Primary osteoarthritis of both knees  Follow up with Sports Medicine per the patient request  Declined follow up xrays  Orders:  •  Ambulatory Referral to Orthopedic Surgery; Future    Benign prostatic hyperplasia with lower urinary tract symptoms, symptom details unspecified    Orders:  •  PSA, total and free; Future    Pain in both feet    Orders:  •  Diclofenac Sodium (VOLTAREN) 1 %; Apply 2 g topically 4 (four) times a day    Screening for colorectal cancer  Deferred       Chronic diarrhea  Notes months of diarrhea  Declined xrays   Use the Voltaren on the knees  Orders:  •  Pancreatic elastase, fecal; Future  •  Calprotectin,Fecal; Future  •  Comprehensive metabolic panel; Future  •  CBC and differential; Future  •  Sedimentation rate, automated; Future  •  Ova and parasite examination; Future  •  Clostridioides difficile toxin by PCR with EIA; Future    Arthritis of both feet  Refilled the Voltaren topical   Orders:  •  Diclofenac Sodium (VOLTAREN) 1 %; Apply 2 g topically 4 (four) times a day    Screening for hypercholesterolemia  Follow up on labs when completed  Orders:  •  Lipid Panel with Direct LDL reflex; Future    Screening PSA (prostate specific antigen)  Follow up on labs when completed  Orders:  •  PSA, total and free; Future       Preventive health issues were discussed with patient, and age appropriate screening tests were ordered as noted in patient's After Visit Summary. Personalized health advice and appropriate referrals for health education or preventive services given if needed, as noted in patient's After Visit Summary.    History of Present Illness     HPI   Patient Care Team:  Avery Hickman DO as PCP - General (Internal  Medicine)  Rajendra Chun, DO as PCP - PCP-[facility] ([facility])  Rajendra Chun, DO as PCP - PCP-[employer] ([facility])    Review of Systems   Constitutional:  Negative for chills and fever.   HENT:  Negative for ear pain and sore throat.    Eyes:  Negative for pain and visual disturbance.   Respiratory:  Negative for cough and shortness of breath.    Cardiovascular:  Negative for chest pain and palpitations.   Gastrointestinal:  Negative for abdominal pain and vomiting.   Genitourinary:  Negative for dysuria and hematuria.   Musculoskeletal:  Negative for arthralgias and back pain.   Skin:  Negative for color change and rash.   Neurological:  Negative for seizures and syncope.   All other systems reviewed and are negative.    Medical History Reviewed by provider this encounter:       Annual Wellness Visit Questionnaire   José Antonio is here for his Subsequent Wellness visit. Last Medicare Wellness visit information reviewed, patient interviewed and updates made to the record today.      Health Risk Assessment:   Patient rates overall health as excellent. Patient feels that their physical health rating is slightly better. Patient is satisfied with their life. Eyesight was rated as slightly worse. Hearing was rated as same. Patient feels that their emotional and mental health rating is same. Patients states they are sometimes angry. Patient states they are sometimes unusually tired/fatigued. Pain experienced in the last 7 days has been a lot. Patient's pain rating has been 6/10. Patient states that he has experienced weight loss or gain in last 6 months.     Depression Screening:   PHQ-2 Score: 0      Fall Risk Screening:   In the past year, patient has experienced: history of falling in past year    Number of falls: 2 or more  Injured during fall?: No    Feels unsteady when standing or walking?: Yes    Worried about falling?: Yes      Home Safety:  Patient has trouble with stairs inside or outside of their  home. Patient has working smoke alarms and has working carbon monoxide detector. Home safety hazards include: none.     Nutrition:   Current diet is Regular.     Medications:   Patient is not currently taking any over-the-counter supplements. Patient is able to manage medications.     Activities of Daily Living (ADLs)/Instrumental Activities of Daily Living (IADLs):   Walk and transfer into and out of bed and chair?: Yes  Dress and groom yourself?: Yes    Bathe or shower yourself?: Yes    Feed yourself? Yes  Do your laundry/housekeeping?: Yes  Manage your money, pay your bills and track your expenses?: Yes  Make your own meals?: Yes    Do your own shopping?: Yes    Advance Care Planning:   Living will: Yes    Durable POA for healthcare: Yes    Advanced directive: Yes      Preventive Screenings      Cardiovascular Screening:    General: Screening Current      Abdominal Aortic Aneurysm (AAA) Screening:    Risk factors include: age between 65-76 yo        Lung Cancer Screening:     General: Screening Not Indicated      Hepatitis C Screening:    General: Screening Current    Immunizations:  - Immunizations due: Zoster (Shingrix)    Screening, Brief Intervention, and Referral to Treatment (SBIRT)     Screening  Typical number of drinks in a day: 0  Typical number of drinks in a week: 0  Interpretation: Low risk drinking behavior.    Single Item Drug Screening:  How often have you used an illegal drug (including marijuana) or a prescription medication for non-medical reasons in the past year? never    Single Item Drug Screen Score: 0  Interpretation: Negative screen for possible drug use disorder    SDOH Risk Assessment  Social determinants of health (SDOH) risk assesment tool was completed. The tool at a minimum covered housing stability, food insecurity, transportation needs, and utility difficulty. Patient had at risk responses for the following SDOH domains: housing stability and utilities.     Social Drivers of  "Health     Financial Resource Strain: Low Risk  (7/14/2023)    Overall Financial Resource Strain (CARDIA)    • Difficulty of Paying Living Expenses: Not very hard   Food Insecurity: No Food Insecurity (5/14/2025)    Nursing - Inadequate Food Risk Classification    • Worried About Running Out of Food in the Last Year: Never true    • Ran Out of Food in the Last Year: Never true   Transportation Needs: No Transportation Needs (5/14/2025)    PRAPARE - Transportation    • Lack of Transportation (Medical): No    • Lack of Transportation (Non-Medical): No   Housing Stability: High Risk (5/14/2025)    Housing Stability Vital Sign    • Unable to Pay for Housing in the Last Year: Yes    • Number of Times Moved in the Last Year: 0    • Homeless in the Last Year: No   Utilities: At Risk (5/14/2025)    [facility] Utilities    • Threatened with loss of utilities: Yes     No results found.    Objective   /60 (BP Location: Left arm, Patient Position: Sitting)   Pulse 77   Temp 98.1 °F (36.7 °C) (Tympanic)   Ht 5' 4\" (1.626 m)   Wt 58.5 kg (129 lb)   SpO2 99%   BMI 22.14 kg/m²     Physical Exam  Vitals and nursing note reviewed.   Constitutional:       General: He is not in acute distress.     Appearance: He is well-developed.   HENT:      Head: Normocephalic and atraumatic.     Eyes:      Conjunctiva/sclera: Conjunctivae normal.       Cardiovascular:      Rate and Rhythm: Normal rate and regular rhythm.      Heart sounds: No murmur heard.  Pulmonary:      Effort: Pulmonary effort is normal. No respiratory distress.      Breath sounds: Normal breath sounds.   Abdominal:      Palpations: Abdomen is soft.      Tenderness: There is no abdominal tenderness.     Musculoskeletal:         General: No swelling.      Cervical back: Neck supple.     Skin:     General: Skin is warm and dry.      Capillary Refill: Capillary refill takes less than 2 seconds.     Neurological:      Mental Status: He is alert.     Psychiatric:         Mood " and Affect: Mood normal.

## 2025-05-14 NOTE — PATIENT INSTRUCTIONS
Medicare Preventive Visit Patient Instructions  Thank you for completing your Welcome to Medicare Visit or Medicare Annual Wellness Visit today. Your next wellness visit will be due in one year (5/15/2026).  The screening/preventive services that you may require over the next 5-10 years are detailed below. Some tests may not apply to you based off risk factors and/or age. Screening tests ordered at today's visit but not completed yet may show as past due. Also, please note that scanned in results may not display below.  Preventive Screenings:  Service Recommendations Previous Testing/Comments   Colorectal Cancer Screening  Colonoscopy    Fecal Occult Blood Test (FOBT)/Fecal Immunochemical Test (FIT)  Fecal DNA/Cologuard Test  Flexible Sigmoidoscopy Age: 45-75 years old   Colonoscopy: every 10 years (May be performed more frequently if at higher risk)  OR  FOBT/FIT: every 1 year  OR  Cologuard: every 3 years  OR  Sigmoidoscopy: every 5 years  Screening may be recommended earlier than age 45 if at higher risk for colorectal cancer. Also, an individualized decision between you and your healthcare provider will decide whether screening between the ages of 76-85 would be appropriate. Colonoscopy: Not on file  FOBT/FIT: Not on file  Cologuard: Not on file  Sigmoidoscopy: Not on file          Prostate Cancer Screening Individualized decision between patient and health care provider in men between ages of 55-69   Medicare will cover every 12 months beginning on the day after your 50th birthday PSA: 0.52 ng/mL           Hepatitis C Screening Once for adults born between 1945 and 1965  More frequently in patients at high risk for Hepatitis C Hep C Antibody: 11/04/2021    Screening Current   Diabetes Screening 1-2 times per year if you're at risk for diabetes or have pre-diabetes Fasting glucose: 98 mg/dL (7/20/2023)  A1C: No results in last 5 years (No results in last 5 years)      Cholesterol Screening Once every 5 years if  you don't have a lipid disorder. May order more often based on risk factors. Lipid panel: 07/20/2023  Screening Current      Other Preventive Screenings Covered by Medicare:  Abdominal Aortic Aneurysm (AAA) Screening: covered once if your at risk. You're considered to be at risk if you have a family history of AAA or a male between the age of 65-75 who smoking at least 100 cigarettes in your lifetime.  Lung Cancer Screening: covers low dose CT scan once per year if you meet all of the following conditions: (1) Age 55-77; (2) No signs or symptoms of lung cancer; (3) Current smoker or have quit smoking within the last 15 years; (4) You have a tobacco smoking history of at least 20 pack years (packs per day x number of years you smoked); (5) You get a written order from a healthcare provider.  Glaucoma Screening: covered annually if you're considered high risk: (1) You have diabetes OR (2) Family history of glaucoma OR (3)  aged 50 and older OR (4)  American aged 65 and older  Osteoporosis Screening: covered every 2 years if you meet one of the following conditions: (1) Have a vertebral abnormality; (2) On glucocorticoid therapy for more than 3 months; (3) Have primary hyperparathyroidism; (4) On osteoporosis medications and need to assess response to drug therapy.  HIV Screening: covered annually if you're between the age of 15-65. Also covered annually if you are younger than 15 and older than 65 with risk factors for HIV infection. For pregnant patients, it is covered up to 3 times per pregnancy.    Immunizations:  Immunization Recommendations   Influenza Vaccine Annual influenza vaccination during flu season is recommended for all persons aged >= 6 months who do not have contraindications   Pneumococcal Vaccine   * Pneumococcal conjugate vaccine = PCV13 (Prevnar 13), PCV15 (Vaxneuvance), PCV20 (Prevnar 20)  * Pneumococcal polysaccharide vaccine = PPSV23 (Pneumovax) Adults 19-63 yo with  certain risk factors or if 65+ yo  If never received any pneumonia vaccine: recommend Prevnar 20 (PCV20)  Give PCV20 if previously received 1 dose of PCV13 or PPSV23   Hepatitis B Vaccine 3 dose series if at intermediate or high risk (ex: diabetes, end stage renal disease, liver disease)   Respiratory syncytial virus (RSV) Vaccine - COVERED BY MEDICARE PART D  * RSVPreF3 (Arexvy) CDC recommends that adults 60 years of age and older may receive a single dose of RSV vaccine using shared clinical decision-making (SCDM)   Tetanus (Td) Vaccine - COST NOT COVERED BY MEDICARE PART B Following completion of primary series, a booster dose should be given every 10 years to maintain immunity against tetanus. Td may also be given as tetanus wound prophylaxis.   Tdap Vaccine - COST NOT COVERED BY MEDICARE PART B Recommended at least once for all adults. For pregnant patients, recommended with each pregnancy.   Shingles Vaccine (Shingrix) - COST NOT COVERED BY MEDICARE PART B  2 shot series recommended in those 19 years and older who have or will have weakened immune systems or those 50 years and older     Health Maintenance Due:      Topic Date Due   • Colorectal Cancer Screening  04/20/2025   • Hepatitis C Screening  Completed     Immunizations Due:      Topic Date Due   • COVID-19 Vaccine (4 - 2024-25 season) 09/01/2024     Advance Directives   What are advance directives?  Advance directives are legal documents that state your wishes and plans for medical care. These plans are made ahead of time in case you lose your ability to make decisions for yourself. Advance directives can apply to any medical decision, such as the treatments you want, and if you want to donate organs.   What are the types of advance directives?  There are many types of advance directives, and each state has rules about how to use them. You may choose a combination of any of the following:  Living will:  This is a written record of the treatment you  want. You can also choose which treatments you do not want, which to limit, and which to stop at a certain time. This includes surgery, medicine, IV fluid, and tube feedings.   Durable power of  for healthcare (DPAHC):  This is a written record that states who you want to make healthcare choices for you when you are unable to make them for yourself. This person, called a proxy, is usually a family member or a friend. You may choose more than 1 proxy.  Do not resuscitate (DNR) order:  A DNR order is used in case your heart stops beating or you stop breathing. It is a request not to have certain forms of treatment, such as CPR. A DNR order may be included in other types of advance directives.  Medical directive:  This covers the care that you want if you are in a coma, near death, or unable to make decisions for yourself. You can list the treatments you want for each condition. Treatment may include pain medicine, surgery, blood transfusions, dialysis, IV or tube feedings, and a ventilator (breathing machine).  Values history:  This document has questions about your views, beliefs, and how you feel and think about life. This information can help others choose the care that you would choose.  Why are advance directives important?  An advance directive helps you control your care. Although spoken wishes may be used, it is better to have your wishes written down. Spoken wishes can be misunderstood, or not followed. Treatments may be given even if you do not want them. An advance directive may make it easier for your family to make difficult choices about your care.   Fall Prevention    Fall prevention  includes ways to make your home and other areas safer. It also includes ways you can move more carefully to prevent a fall. Health conditions that cause changes in your blood pressure, vision, or muscle strength and coordination may increase your risk for falls. Medicines may also increase your risk for falls if  they make you dizzy, weak, or sleepy.   Fall prevention tips:   Stand or sit up slowly.    Use assistive devices as directed.    Wear shoes that fit well and have soles that .    Wear a personal alarm.    Stay active.    Manage your medical conditions.    Home Safety Tips:  Add items to prevent falls in the bathroom.    Keep paths clear.    Install bright lights in your home.    Keep items you use often on shelves within reach.    Paint or place reflective tape on the edges of your stairs.     © Copyright [employer] 2018 Information is for End User's use only and may not be sold, redistributed or otherwise used for commercial purposes. All illustrations and images included in CareNotes® are the copyrighted property of [employer].D[employer]M., Inc. or [employer]

## 2025-05-20 VITALS
HEIGHT: 64 IN | HEART RATE: 78 BPM | WEIGHT: 129 LBS | BODY MASS INDEX: 22.02 KG/M2 | OXYGEN SATURATION: 98 % | TEMPERATURE: 98.8 F

## 2025-05-20 DIAGNOSIS — M25.561 CHRONIC PAIN OF BOTH KNEES: ICD-10-CM

## 2025-05-20 DIAGNOSIS — M25.562 CHRONIC PAIN OF BOTH KNEES: ICD-10-CM

## 2025-05-20 DIAGNOSIS — M17.0 PRIMARY OSTEOARTHRITIS OF BOTH KNEES: Primary | ICD-10-CM

## 2025-05-20 DIAGNOSIS — G89.29 CHRONIC PAIN OF BOTH KNEES: ICD-10-CM

## 2025-05-20 PROCEDURE — 99214 OFFICE O/P EST MOD 30 MIN: CPT | Performed by: FAMILY MEDICINE

## 2025-05-20 RX ORDER — MELOXICAM 7.5 MG/1
7.5 TABLET ORAL DAILY PRN
Qty: 30 TABLET | Refills: 2 | Status: SHIPPED | OUTPATIENT
Start: 2025-05-20

## 2025-05-20 NOTE — PROGRESS NOTES
[facility] [address] [place]  [facility] [id number] [phone]      Assessment:  1. Primary osteoarthritis of both knees  -     meloxicam (Mobic) 7.5 mg tablet; Take 1 tablet (7.5 mg total) by mouth daily as needed for moderate pain  2. Chronic pain of both knees  -     XR knee 3 vw right non injury; Future; Expected date: 05/20/2025  -     XR knee 3 vw left non injury; Future; Expected date: 05/20/2025  -     meloxicam (Mobic) 7.5 mg tablet; Take 1 tablet (7.5 mg total) by mouth daily as needed for moderate pain    Assessment & Plan  Chronic pain of both knees    Orders:    XR knee 3 vw right non injury; Future    XR knee 3 vw left non injury; Future    meloxicam (Mobic) 7.5 mg tablet; Take 1 tablet (7.5 mg total) by mouth daily as needed for moderate pain    Primary osteoarthritis of both knees    Orders:    meloxicam (Mobic) 7.5 mg tablet; Take 1 tablet (7.5 mg total) by mouth daily as needed for moderate pain      Patient Instructions   F/u as needed  Icing/heat/OTC pain meds as needed.  Home exercises  Meloxicam as needed.  Patient declines steroid injection  Plan:  Patient Instructions   F/u as needed  Icing/heat/OTC pain meds as needed.  Home exercises  Meloxicam as needed.  Patient declines steroid injection   Return if symptoms worsen or fail to improve.    Chief Complaint:  Chief Complaint   Patient presents with    Left Knee - Follow-up    Right Knee - Follow-up       Subjective:   HPI    Patient ID: José Antonio Payne is a 65 y.o. male     Here c/o B knee pain  Pain walking  Using biofreeeze  Advil PRN helps  Sharp pain    Review of Systems   Constitutional:  Negative for fatigue and fever.   Respiratory:  Negative for shortness of breath.    Cardiovascular:  Negative for chest pain.   Gastrointestinal:  Negative for abdominal pain and nausea.   Genitourinary:  Negative for dysuria.   Musculoskeletal:  Positive for arthralgias.   Skin:   "Negative for rash and wound.   Neurological:  Negative for weakness and headaches.       Objective:  Vitals:  Pulse 78   Temp 98.8 °F (37.1 °C) (Tympanic)   Ht 5' 4\" (1.626 m)   Wt 58.5 kg (129 lb)   SpO2 98%   BMI 22.14 kg/m²     The following portions of the patient's history were reviewed and updated as appropriate: allergies, current medications, past family history, past medical history, past social history, past surgical history, and problem list.    Physical exam:  Physical Exam  Constitutional:       Appearance: Normal appearance. He is normal weight.     Eyes:      Extraocular Movements: Extraocular movements intact.     Pulmonary:      Effort: Pulmonary effort is normal.     Musculoskeletal:         General: Tenderness present.      Cervical back: Normal range of motion.      Right knee: No effusion.      Instability Tests: Medial Mello test negative and lateral Mello test negative.      Left knee: No effusion.      Instability Tests: Medial Mello test negative and lateral Mello test negative.     Skin:     General: Skin is warm and dry.     Neurological:      General: No focal deficit present.      Mental Status: He is alert and oriented to person, place, and time. Mental status is at baseline.     Psychiatric:         Mood and Affect: Mood normal.         Behavior: Behavior normal.         Thought Content: Thought content normal.         Judgment: Judgment normal.       Right Knee Exam   Right knee exam is normal.    Muscle Strength   The patient has normal right knee strength.    Tenderness   The patient is experiencing no tenderness.     Range of Motion   The patient has normal right knee ROM.    Tests   Mello:  Medial - negative Lateral - negative  Varus: negative Valgus: negative    Other   Swelling: none  Effusion: no effusion present      Left Knee Exam   Left knee exam is normal.    Muscle Strength   The patient has normal left knee strength.    Tenderness   The patient is " experiencing no tenderness.     Range of Motion   The patient has normal left knee ROM.    Tests   Mello:  Medial - negative Lateral - negative  Varus: negative     Other   Swelling: none  Effusion: no effusion present          Observations   Left Knee   Negative for effusion.     Right Knee   Negative for effusion.       I have personally reviewed pertinent films in PACS and my interpretation is XR B knee- medial joint space narrowing/mod OA.      Hiro Kendall MD

## 2025-05-20 NOTE — PATIENT INSTRUCTIONS
F/u as needed  Icing/heat/OTC pain meds as needed.  Home exercises  Meloxicam as needed.  Patient declines steroid injection

## 2025-06-26 ENCOUNTER — VBI (OUTPATIENT)
Dept: ADMINISTRATIVE | Facility: OTHER | Age: 65
End: 2025-06-26

## 2025-06-26 DIAGNOSIS — Z12.11 SCREEN FOR COLON CANCER: Primary | ICD-10-CM

## 2025-06-26 NOTE — TELEPHONE ENCOUNTER
06/26/25 1:18 PM    The patient was called and a message was left for patient to return a call to the [facility] Department at [facility]: Phone [phone] .    Thank you.  Lynette Suresh MA  [facility]

## 2025-08-12 ENCOUNTER — OFFICE VISIT (OUTPATIENT)
Dept: URGENT CARE | Facility: CLINIC | Age: 65
End: 2025-08-12
Payer: MEDICARE

## 2025-08-12 ENCOUNTER — APPOINTMENT (EMERGENCY)
Dept: CT IMAGING | Facility: HOSPITAL | Age: 65
End: 2025-08-12
Payer: MEDICARE

## 2025-08-12 ENCOUNTER — HOSPITAL ENCOUNTER (EMERGENCY)
Facility: HOSPITAL | Age: 65
Discharge: HOME | End: 2025-08-12
Attending: EMERGENCY MEDICINE | Admitting: EMERGENCY MEDICINE
Payer: MEDICARE

## 2025-08-18 ENCOUNTER — APPOINTMENT (OUTPATIENT)
Dept: RADIOLOGY | Facility: CLINIC | Age: 65
End: 2025-08-18
Attending: INTERNAL MEDICINE
Payer: MEDICARE

## 2025-08-18 ENCOUNTER — OFFICE VISIT (OUTPATIENT)
Dept: INTERNAL MEDICINE CLINIC | Facility: CLINIC | Age: 65
End: 2025-08-18
Payer: MEDICARE

## 2025-08-18 VITALS
BODY MASS INDEX: 22.11 KG/M2 | TEMPERATURE: 97.3 F | DIASTOLIC BLOOD PRESSURE: 62 MMHG | WEIGHT: 128.8 LBS | OXYGEN SATURATION: 98 % | SYSTOLIC BLOOD PRESSURE: 118 MMHG | HEART RATE: 69 BPM

## 2025-08-18 DIAGNOSIS — M54.50 ACUTE BILATERAL LOW BACK PAIN WITHOUT SCIATICA: Primary | ICD-10-CM

## 2025-08-18 DIAGNOSIS — M54.50 ACUTE BILATERAL LOW BACK PAIN WITHOUT SCIATICA: ICD-10-CM

## 2025-08-18 PROCEDURE — 99214 OFFICE O/P EST MOD 30 MIN: CPT | Performed by: INTERNAL MEDICINE

## 2025-08-18 PROCEDURE — 72100 X-RAY EXAM L-S SPINE 2/3 VWS: CPT

## 2025-08-18 RX ORDER — TRIAMCINOLONE ACETONIDE 40 MG/ML
40 INJECTION, SUSPENSION INTRA-ARTICULAR; INTRAMUSCULAR ONCE
Status: COMPLETED | OUTPATIENT
Start: 2025-08-18 | End: 2025-08-18

## 2025-08-18 RX ORDER — PREDNISONE 20 MG/1
TABLET ORAL
Qty: 20 TABLET | Refills: 0 | Status: SHIPPED | OUTPATIENT
Start: 2025-08-18

## 2025-08-18 RX ORDER — CYCLOBENZAPRINE HCL 10 MG
10 TABLET ORAL 3 TIMES DAILY PRN
Qty: 63 TABLET | Refills: 0 | Status: SHIPPED | OUTPATIENT
Start: 2025-08-18 | End: 2025-08-25

## 2025-08-18 RX ADMIN — TRIAMCINOLONE ACETONIDE 40 MG: 40 INJECTION, SUSPENSION INTRA-ARTICULAR; INTRAMUSCULAR at 11:49
